# Patient Record
Sex: FEMALE | Employment: UNEMPLOYED | URBAN - METROPOLITAN AREA
[De-identification: names, ages, dates, MRNs, and addresses within clinical notes are randomized per-mention and may not be internally consistent; named-entity substitution may affect disease eponyms.]

---

## 2017-07-03 ENCOUNTER — TELEPHONE (OUTPATIENT)
Dept: FAMILY MEDICINE | Facility: CLINIC | Age: 52
End: 2017-07-03

## 2017-07-03 DIAGNOSIS — Z12.31 VISIT FOR SCREENING MAMMOGRAM: Primary | ICD-10-CM

## 2017-07-03 NOTE — LETTER
July 3, 2017    Yolanda Cheek  [address] [facility] [address]    Dear Yolanda    We care about your health and have reviewed your health plan. We have reviewed your medical conditions, medication list, and lab results and are making recommendations based on this review, to better manage your health.    You are in particular need of attention regarding:  - Your Depression  - Scheduling a Breast Cancer Screening (Mammography) [phone]  - Scheduling a Colon Cancer Screening (Colonoscopy only) [phone]      Here is a list of Health Maintenance topics that are due now or due soon:  Health Maintenance Due   Topic Date Due     MAMMO SCREEN Q2 YR (SYSTEM ASSIGNED)  04/16/2007     PHQ-9 Q6 MONTHS  02/18/2017     COLON CANCER SCREEN (SYSTEM ASSIGNED)  04/16/2017     We will be calling you in the next couple of weeks to help you schedule any appointments that are needed.  Please call us at [phone] (or use Digital Lumens) to address the above recommendations.     Thank you for trusting [facility] and we appreciate the opportunity to serve you.  We look forward to supporting your healthcare needs in the future.    Healthy Regards,    Your Care Team

## 2017-07-03 NOTE — TELEPHONE ENCOUNTER
Panel Management Review      Patient has the following on her problem list:     Depression / Dysthymia review  PHQ-9 SCORE 8/18/2016   Total Score 2      Patient is due for:  PHQ9      Composite cancer screening  Chart review shows that this patient is due/due soon for the following Mammogram and Colonoscopy  Summary:    Patient is due/failing the following:   COLONOSCOPY, MAMMOGRAM and PHQ9    Action needed:   Patient needs referral/order: Mammogram, Colon, and OV for Depression    Type of outreach:    Sent letter.    Questions for provider review:    None                                                                                                                                    Oksana Estrada MA       Chart routed to Care Team .

## 2017-11-15 ENCOUNTER — TELEPHONE (OUTPATIENT)
Dept: FAMILY MEDICINE | Facility: CLINIC | Age: 52
End: 2017-11-15

## 2017-11-15 NOTE — LETTER
November 15, 2017    Yolanda Cheek  [id number]  [facility] [address]    Dear Yolanda    We care about your health and have reviewed your health plan. We have reviewed your medical conditions, medication list, and lab results and are making recommendations based on this review, to better manage your health.    You are in particular need of attention regarding:  - Your Depression  - Scheduling a Breast Cancer Screening (Mammography) [phone]  - Scheduling a Colon Cancer Screening (Colonoscopy only) [phone]      Here is a list of Health Maintenance topics that are due now or due soon:  Health Maintenance Due   Topic Date Due     PHQ-9 Q6 MONTHS  02/18/2017     MAMMO SCREEN Q2 YR (SYSTEM ASSIGNED)  04/16/2017     COLON CANCER SCREEN (SYSTEM ASSIGNED)  04/16/2017     INFLUENZA VACCINE (SYSTEM ASSIGNED)  09/01/2017     COPD ACTION PLAN Q1 YR  12/05/2017     DEPRESSION ACTION PLAN Q1 YR  12/05/2017     We will be calling you in the next couple of weeks to help you schedule any appointments that are needed.  Please call us at [phone] (or use Narrative) to address the above recommendations.     Thank you for trusting [facility] and we appreciate the opportunity to serve you.  We look forward to supporting your healthcare needs in the future.    Healthy Regards,  Ariana Allison

## 2017-11-15 NOTE — TELEPHONE ENCOUNTER
Panel Management Review      Patient has the following on her problem list:     Depression / Dysthymia review    Measure:  Needs PHQ-9 score of 4 or less during index window.  Administer PHQ-9 and if score is 5 or more, send encounter to provider for next steps.    5   7 month window range:     PHQ-9 SCORE 8/18/2016   Total Score 2       If PHQ-9 recheck is 5 or more, route to provider for next steps.    Patient is due for:  PHQ9 and DAP        Composite cancer screening  Chart review shows that this patient is due/due soon for the following Mammogram and Colonoscopy  Summary:    Patient is due/failing the following:   COLONOSCOPY, DAP, MAMMOGRAM and PHQ9    Action needed:   Patient needs to do PHQ9. and MAMMOGRAM AND COLONOSCOPY     Type of outreach:    Sent letter. AND PHQ9     Questions for provider review:    None                                                                                                                                    Mel Rios MA       Chart routed to Care Team .

## 2018-02-13 ENCOUNTER — TELEPHONE (OUTPATIENT)
Dept: FAMILY MEDICINE | Facility: CLINIC | Age: 53
End: 2018-02-13

## 2018-02-13 NOTE — LETTER
February 13, 2018    Yolanda Rodriguezyara  [id number]  [facility] [place] [place] [address]    Dear Yolanda    We care about your health and have reviewed your health plan. We have reviewed your medical conditions, medication list, and lab results and are making recommendations based on this review, to better manage your health.    You are in particular need of attention regarding:  - Your Depression  - Scheduling a Breast Cancer Screening (Mammography) [phone]  - Scheduling a Colon Cancer Screening (Colonoscopy only) [phone]      Here is a list of Health Maintenance topics that are due now or due soon:  Health Maintenance Due   Topic Date Due     PHQ-9 Q6 MONTHS  02/18/2017     MAMMO SCREEN Q2 YR (SYSTEM ASSIGNED)  04/16/2017     COLON CANCER SCREEN (SYSTEM ASSIGNED)  04/16/2017     INFLUENZA VACCINE (SYSTEM ASSIGNED)  09/01/2017     COPD ACTION PLAN Q1 YR  12/05/2017     DEPRESSION ACTION PLAN Q1 YR  12/05/2017     We will be calling you in the next couple of weeks to help you schedule any appointments that are needed.  Please call us at [phone] (or use Jarvam) to address the above recommendations.     Thank you for trusting [facility] and we appreciate the opportunity to serve you.  We look forward to supporting your healthcare needs in the future.    Healthy Regards,    Maggie Allison

## 2018-02-13 NOTE — LETTER
March 14, 2018    Yolanda Adia  [id number]  [place] [place] [facility] [id number]      Dear Yolanda Adia,     We have tried to contact you about your health, but have been unable to reach you.  Please call us as soon as possible so we can provide you with the best care possible.  We will continue to check in with you throughout the year to complete these items of care, if you are not able to complete these items at this time.  If you would like to complete the missing items for your care, please contact us at [phone].    We recommend the following:  -schedule a MAMMOGRAM 1 in 8 women will develop invasive breast cancer during her lifetime and it is the most common non-skin cancer in American women.  EARLY detection, new treatments, and a better understanding of the disease have increased survival rates - the 5 year survival rate in the 1960s was 63% and today it is close to 90% .  Please disregard this reminder if you have had this exam elsewhere within the last year.  It would be helpful for us to have a copy of your mammogram report in our file so that we can best coordinate your care - please contact us with when your test was done so we can update your record. Please call [phone] to schedule your mammogram today.   -schedule a COLONOSCOPY to look for colon cancer (due every 10 years or 5 years in higher risk situations.)   Colon cancer is now the second leading cause of death in the [place] for both men and women and there are over 130,000 new cases and 50,000 deaths per year from colon cancer.  Colonoscopies can prevent 90-95% of these deaths.  Problem lesions can be removed before they ever become cancer.  This test is not only looking for cancer, but also getting rid of precancerious lesions.  If you do not wish to do a colonoscopy or cannot afford to do one, at this time, there is another option. It is called a FIT test.        Sincerely,     Your Care Team at [place]  [facility]

## 2018-02-13 NOTE — TELEPHONE ENCOUNTER
Panel Management Review      Patient has the following on her problem list:     Depression / Dysthymia review    Measure:  Needs PHQ-9 score of 4 or less during index window.  Administer PHQ-9 and if score is 5 or more, send encounter to provider for next steps.    5   7 month window range:     PHQ-9 SCORE 8/18/2016   Total Score 2       If PHQ-9 recheck is 5 or more, route to provider for next steps.    Patient is due for:  PHQ9 and DAP      Composite cancer screening  Chart review shows that this patient is due/due soon for the following Mammogram and Colonoscopy  Summary:    Patient is due/failing the following:   COLONOSCOPY, DAP, MAMMOGRAM and PHQ9    Action needed:   Patient needs office visit for depression .    Type of outreach:    Sent letter.    Questions for provider review:    None                                                                                                                                    Mel Rios MA       Chart routed to Care Team .

## 2021-07-09 ENCOUNTER — TRANSCRIBE ORDERS (OUTPATIENT)
Dept: OTHER | Age: 56
End: 2021-07-09

## 2021-07-09 DIAGNOSIS — C34.90 LUNG CANCER (H): Primary | ICD-10-CM

## 2021-07-14 NOTE — TELEPHONE ENCOUNTER
RECORDS STATUS - ALL OTHER DIAGNOSIS      RECORDS RECEIVED FROM: [facility]   DATE RECEIVED:    NOTES STATUS DETAILS   OFFICE NOTE from referring provider [facility]    OFFICE NOTE from medical oncologist [facility] Dr. Zoey Garcia: 21   DISCHARGE SUMMARY from hospital NA    DISCHARGE REPORT from the ER [facility] 21, 21, 21, 21, 21, 3/18/21, 2/15/21   OPERATIVE REPORT [facility] 21: EBUS  20: EGD   MEDICATION LIST [facility]   CLINICAL TRIAL TREATMENTS TO DATE     LABS     PATHOLOGY REPORTS [facility]), Report in CE, Slides requested   [employer] 21: [id number]   ANYTHING RELATED TO DIAGNOSIS Epic/CE 21   GENONOMIC TESTING     TYPE:     IMAGING (NEED IMAGES & REPORT)     XR PACS 21: [facility]   CT SCANS PACS 21: [facility]   MRI PACS 21: [facility]   MAMMO     ULTRASOUND     PET PACS 21: [facility]

## 2021-07-19 NOTE — TELEPHONE ENCOUNTER
Path slides arrived from [place] - taken to 5th [facility] path lab

## 2021-07-20 ENCOUNTER — LAB (OUTPATIENT)
Dept: LAB | Facility: CLINIC | Age: 56
End: 2021-07-20
Payer: COMMERCIAL

## 2021-07-20 DIAGNOSIS — C34.91 ADENOCARCINOMA OF LUNG, RIGHT (H): Primary | ICD-10-CM

## 2021-07-21 ENCOUNTER — PRE VISIT (OUTPATIENT)
Dept: SURGERY | Facility: CLINIC | Age: 56
End: 2021-07-21

## 2021-07-21 ENCOUNTER — VIRTUAL VISIT (OUTPATIENT)
Dept: SURGERY | Facility: CLINIC | Age: 56
End: 2021-07-21
Attending: THORACIC SURGERY (CARDIOTHORACIC VASCULAR SURGERY)
Payer: COMMERCIAL

## 2021-07-21 DIAGNOSIS — C34.2 LUNG CANCER, MIDDLE LOBE (H): Primary | ICD-10-CM

## 2021-07-21 PROCEDURE — 999N000109 HC STATISTIC OP CR VISIT

## 2021-07-21 PROCEDURE — 99204 OFFICE O/P NEW MOD 45 MIN: CPT | Mod: 95 | Performed by: THORACIC SURGERY (CARDIOTHORACIC VASCULAR SURGERY)

## 2021-07-21 RX ORDER — MULTIVIT WITH MINERALS/LUTEIN
250 TABLET ORAL EVERY MORNING
COMMUNITY
Start: 2020-09-25

## 2021-07-21 RX ORDER — ONDANSETRON 4 MG/1
4 TABLET, ORALLY DISINTEGRATING ORAL EVERY 4 HOURS PRN
COMMUNITY
Start: 2021-07-07

## 2021-07-21 RX ORDER — UBIDECARENONE 75 MG
CAPSULE ORAL EVERY MORNING
COMMUNITY
Start: 2020-09-27

## 2021-07-21 RX ORDER — OMEPRAZOLE 40 MG/1
CAPSULE, DELAYED RELEASE ORAL EVERY MORNING
Status: ON HOLD | COMMUNITY
Start: 2021-03-22 | End: 2021-08-27

## 2021-07-21 RX ORDER — TRAZODONE HYDROCHLORIDE 50 MG/1
50 TABLET, FILM COATED ORAL AT BEDTIME
COMMUNITY
Start: 2021-07-01

## 2021-07-21 RX ORDER — LIDOCAINE HYDROCHLORIDE 20 MG/ML
SOLUTION OROPHARYNGEAL
Status: ON HOLD | COMMUNITY
Start: 2021-05-08 | End: 2021-08-27

## 2021-07-21 RX ORDER — GABAPENTIN 300 MG/1
CAPSULE ORAL
Status: ON HOLD | COMMUNITY
Start: 2021-02-16 | End: 2021-08-27

## 2021-07-21 NOTE — PROGRESS NOTES
"Yolanda is a 56 year old who is being evaluated via a billable video visit.      How would you like to obtain your AVS? MyChart  If the video visit is dropped, the invitation should be resent by: Text to cell phone: [phone]  Will anyone else be joining your video visit? No       I have reviewed and updated the patient's allergies and medication list.    Concerns: none  Refills: none     Vitals - Patient Reported  Weight (Patient Reported): 62.1 kg (137 lb)  Height (Patient Reported): 172.7 cm (5' 8\")  BMI (Based on Pt Reported Ht/Wt): 20.83  Pain Score: No Pain (0)    Yakelin Lockwood CMA        Phone visit: 10 min    THORACIC SURGERY - NEW PATIENT OFFICE VISIT     Dear Dr. Stanley,    I saw Ms. Cheek at your request in consultation for the evaluation and treatment of a lung cancer.     HPI  Ms. 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 She originally presented 5/7/2021 to ED with abdominal pain for weeks with middle lobe nodule found incidentally. Follow-up CT chest demonstrated 1.6 x 2.3 RML nodule. She subsequently underwent EBUS with Dr. Richmond with mass positive for adenocarcinoma.      Previsit Tests   5/7/2021 CT chest with IV contrast      6/16/2021 EBUS (Pathology consult requested)  Right hilar mass positive for adenocarcinoma, TTF-1 positive  Station 7 and 11R negative for malignancy    6/29/2021 PET CT  Hyperintense right middle lobe lesion, no evidence of distal mets    7/1/2021 - MRI brain negative    PFT (7/1/2021; [facility], no report available)    PMH  Tobacco use disorder   Dysplasia of cervix   Asthma  Alcohol abuse   PTSD (post-traumatic stress disorder)   Major depressive disorder, recurrent episode, moderate (HCC)   Chronic obstructive pulmonary disease (HCC)   Gastroesophageal reflux disease       Past Medical History:   Diagnosis Date     Alcohol abuse with intoxication (H) 7/24/2016 "     Chemical dependency (H) 2016     Substance abuse 2016    alcoholism        PSH  HUMZA unilateral salpingo oophorectomy    Past Surgical History:   Procedure Laterality Date     GYN SURGERY      hysterectomy with one ovary removed.      HYSTERECTOMY, PAP NO LONGER INDICATED         ETOH: history of abuse, quit 2021  TOB: 40 pack years, quit 2021      IMPRESSION (C34.2) Lung cancer, middle lobe (H)  (primary encounter diagnosis)    56 year old year-old female with a RIGHT middle lobe adenocarcinoma, clinical stage IIB [id number]    PLAN  I spent 20 min on the date of the encounter in chart review, patient visit, review of tests, documentation and/or discussion with other providers about the issues documented above. I reviewed the plan as follows:    Since she lives far away, we plan to see her in person in clinic on a Wednesday and do surgery the following Friday.      1) Procedure planned: TEMLA, possible RIGHT VATS middle lobectomy, with possible thoracotomy.  I reviewed the indications, risks, and benefits of the procedure with Ms. Yolanda Cheek. We discussed the intraoperative risks of bleeding and injury to vital organs, potential postoperative complications including, but not limited to, major respiratory events, arrhythmia, bleeding, infection, reoperation, and death. I explained the anticipated hospital course (+/- 1-3 days) and postoperative recovery including pain control, chest drain management, and variable degrees of dyspnea (or need for supplemental oxygen) and fatigue that tend to get better with time.      Necessary Preop Testin) Repeat CT chest with IV contrast prior to surgery  2) Over-read of brain MRI and PET-CT  3) Review pathology consult  4) Obtain PFT report from 2021      They had all their questions answered and were in agreement with the plan.  I appreciate the opportunity to participate in the care of your patient and will keep you updated.  Sincerely,

## 2021-07-21 NOTE — LETTER
"    7/21/2021         RE: Yolanda Cheek  [address]  [facility] [address]        Dear Colleague,    Thank you for referring your patient, Yolanda Cheek, to the [facility]. Please see a copy of my visit note below.    Yolanda is a 56 year old who is being evaluated via a billable video visit.      How would you like to obtain your AVS? MyChart  If the video visit is dropped, the invitation should be resent by: Text to cell phone: [phone]  Will anyone else be joining your video visit? No       I have reviewed and updated the patient's allergies and medication list.    Concerns: none  Refills: none     Vitals - Patient Reported  Weight (Patient Reported): 62.1 kg (137 lb)  Height (Patient Reported): 172.7 cm (5' 8\")  BMI (Based on Pt Reported Ht/Wt): 20.83  Pain Score: No Pain (0)    Yakelin Lockwood CMA        Phone visit: 10 min    THORACIC SURGERY - NEW PATIENT OFFICE VISIT     Dear Dr. Stanley,    I saw Ms. Cheek at your request in consultation for the evaluation and treatment of a lung cancer.     HPI  Ms. 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 She originally presented 5/7/2021 to ED with abdominal pain for weeks with middle lobe nodule found incidentally. Follow-up CT chest demonstrated 1.6 x 2.3 RML nodule. She subsequently underwent EBUS with Dr. Richmond with mass positive for adenocarcinoma.      Previsit Tests   5/7/2021 CT chest with IV contrast      6/16/2021 EBUS (Pathology consult requested)  Right hilar mass positive for adenocarcinoma, TTF-1 positive  Station 7 and 11R negative for malignancy    6/29/2021 PET CT  Hyperintense right middle lobe lesion, no evidence of distal mets    7/1/2021 - MRI brain negative    PFT (7/1/2021; [facility], no report available)    PMH  Tobacco use disorder   Dysplasia of cervix   Asthma  Alcohol abuse   PTSD " (post-traumatic stress disorder)   Major depressive disorder, recurrent episode, moderate (HCC)   Chronic obstructive pulmonary disease (HCC)   Gastroesophageal reflux disease       Past Medical History:   Diagnosis Date     Alcohol abuse with intoxication (H) 2016     Chemical dependency (H) 2016     Substance abuse 2016    alcoholism        PSH  HUMZA unilateral salpingo oophorectomy    Past Surgical History:   Procedure Laterality Date     GYN SURGERY      hysterectomy with one ovary removed.      HYSTERECTOMY, PAP NO LONGER INDICATED         ETOH: history of abuse, quit 2021  TOB: 40 pack years, quit 2021      IMPRESSION (C34.2) Lung cancer, middle lobe (H)  (primary encounter diagnosis)    56 year old year-old female with a RIGHT middle lobe adenocarcinoma, clinical stage IIB [id number]    PLAN  I spent 20 min on the date of the encounter in chart review, patient visit, review of tests, documentation and/or discussion with other providers about the issues documented above. I reviewed the plan as follows:    Since she lives far away, we plan to see her in person in clinic on a Wednesday and do surgery the following Friday.      1) Procedure planned: TEMLA, possible RIGHT VATS middle lobectomy, with possible thoracotomy.  I reviewed the indications, risks, and benefits of the procedure with Ms. Yolanda Cheek. We discussed the intraoperative risks of bleeding and injury to vital organs, potential postoperative complications including, but not limited to, major respiratory events, arrhythmia, bleeding, infection, reoperation, and death. I explained the anticipated hospital course (+/- 1-3 days) and postoperative recovery including pain control, chest drain management, and variable degrees of dyspnea (or need for supplemental oxygen) and fatigue that tend to get better with time.      Necessary Preop Testin) Repeat CT chest with IV contrast prior to surgery  2) Over-read of brain MRI  and PET-CT  3) Review pathology consult  4) Obtain PFT report from 7/1/2021      They had all their questions answered and were in agreement with the plan.  I appreciate the opportunity to participate in the care of your patient and will keep you updated.  Sincerely,        Again, thank you for allowing me to participate in the care of your patient.        Sincerely,        Eliezer Knox MD

## 2021-07-23 LAB
PATH REPORT.COMMENTS IMP SPEC: NORMAL
PATH REPORT.FINAL DX SPEC: NORMAL
PATH REPORT.GROSS SPEC: NORMAL
PATH REPORT.RELEVANT HX SPEC: NORMAL
PATH REPORT.RELEVANT HX SPEC: NORMAL
PATH REPORT.SITE OF ORIGIN SPEC: NORMAL

## 2021-07-23 PROCEDURE — 88321 CONSLTJ&REPRT SLD PREP ELSWR: CPT | Performed by: PATHOLOGY

## 2021-07-27 ENCOUNTER — TELEPHONE (OUTPATIENT)
Dept: SURGERY | Facility: CLINIC | Age: 56
End: 2021-07-27

## 2021-07-27 NOTE — TELEPHONE ENCOUNTER
Received voicemail from [facility] - new patient oncology scheduling.    Stated Yolanda is calling in to schedule procedure with Dr. Knox.    [facility] has the incorrect number for Dr. Knox's surgical scheduler.     Magalie can be reached at [phone].    Will route call to Magalie and also try to update new patient scheduling so that they have correct information.

## 2021-07-28 ENCOUNTER — TELEPHONE (OUTPATIENT)
Dept: SURGERY | Facility: CLINIC | Age: 56
End: 2021-07-28

## 2021-07-28 NOTE — TELEPHONE ENCOUNTER
Received voicemail from patient.    Saw Dr. Knox and Dr. Knox told her that surgery cannot wait. Patient seems frustrated because she has not heard back from Dr. Knox's team regarding scheduling surgery.    I am not the surgical coordinator for Dr. Knox.     Called Yolanda to let her know that I am not the coordinator and provided her Magalie's number and noted that I have sent two messages to the team.     Routed patient call from new patient oncology scheduling yesterday to Magalie.     Will route this patient call to Magalie and also to Dr. Knox as I am not sure who the RNCC.     - I did reach out to new patient Oncology scheduling yesterday and provided them an updated niru-op coordinator list. Unsure who provided my number to patient.

## 2021-08-02 DIAGNOSIS — C34.2 LUNG CANCER, MIDDLE LOBE (H): Primary | ICD-10-CM

## 2021-08-03 ENCOUNTER — HOSPITAL ENCOUNTER (INPATIENT)
Dept: GENERAL RADIOLOGY | Facility: CLINIC | Age: 56
End: 2021-08-03
Attending: CLINICAL NURSE SPECIALIST
Payer: COMMERCIAL

## 2021-08-03 DIAGNOSIS — C34.2 LUNG CANCER, MIDDLE LOBE (H): ICD-10-CM

## 2021-08-03 PROCEDURE — 78815 PET IMAGE W/CT SKULL-THIGH: CPT | Mod: 26

## 2021-08-04 ENCOUNTER — PREP FOR PROCEDURE (OUTPATIENT)
Dept: SURGERY | Facility: CLINIC | Age: 56
End: 2021-08-04

## 2021-08-04 DIAGNOSIS — R91.8 LUNG MASS: Primary | ICD-10-CM

## 2021-08-04 RX ORDER — CEFAZOLIN SODIUM 2 G/50ML
2 SOLUTION INTRAVENOUS
Status: CANCELLED | OUTPATIENT
Start: 2021-08-04

## 2021-08-04 RX ORDER — CEFAZOLIN SODIUM 2 G/50ML
2 SOLUTION INTRAVENOUS SEE ADMIN INSTRUCTIONS
Status: CANCELLED | OUTPATIENT
Start: 2021-08-04

## 2021-08-05 ENCOUNTER — TELEPHONE (OUTPATIENT)
Dept: SURGERY | Facility: CLINIC | Age: 56
End: 2021-08-05

## 2021-08-05 DIAGNOSIS — Z11.59 ENCOUNTER FOR SCREENING FOR OTHER VIRAL DISEASES: ICD-10-CM

## 2021-08-05 PROBLEM — R91.8 LUNG MASS: Status: ACTIVE | Noted: 2021-08-05

## 2021-08-05 NOTE — TELEPHONE ENCOUNTER
Spoke with patient to schedule procedure with Dr. Eliezer Knox    Procedure was scheduled on 08/27 at [facility] OR  Patient will have H&P with PAC 08/25    Patient is aware a COVID-19 test is needed before their procedure. The test should be with-in 4 days of their procedure.   Test Details: Date 08/25 Location [facility]    Patient is aware a / is needed day of surgery.   Surgery letter was sent via [employer] and Mail, patient has my direct contact information for any further questions.

## 2021-08-06 NOTE — TELEPHONE ENCOUNTER
FUTURE VISIT INFORMATION      SURGERY INFORMATION:    Date: 21    Location: [facility] OR    Surgeon:  Eliezer Knox MD    Anesthesia Type:  general    Procedure: RIGHT THORACOSCOPIC MIDDLE LOBECTOMY LYMPHADENECTOMY, MEDIASTINUM, EXTENDED, TRANSCERVICAL APPROACH    Consult: virtual visit     RECORDS REQUESTED FROM:       Primary Care Provider: Ivette Stewart MD - [facility]    Pertinent Medical History: COPD    Most recent EKG+ Tracin21- [facility]

## 2021-08-11 ENCOUNTER — PREP FOR PROCEDURE (OUTPATIENT)
Dept: SURGERY | Facility: CLINIC | Age: 56
End: 2021-08-11

## 2021-08-12 DIAGNOSIS — R91.8 LUNG MASS: Primary | ICD-10-CM

## 2021-08-13 ENCOUNTER — HOSPITAL ENCOUNTER (INPATIENT)
Dept: GENERAL RADIOLOGY | Facility: CLINIC | Age: 56
End: 2021-08-13
Attending: CLINICAL NURSE SPECIALIST
Payer: COMMERCIAL

## 2021-08-13 DIAGNOSIS — C34.2 LUNG CANCER, MIDDLE LOBE (H): ICD-10-CM

## 2021-08-13 DIAGNOSIS — C34.2 LUNG CANCER, MIDDLE LOBE (H): Primary | ICD-10-CM

## 2021-08-13 PROCEDURE — 70553 MRI BRAIN STEM W/O & W/DYE: CPT | Mod: 26 | Performed by: RADIOLOGY

## 2021-08-14 ENCOUNTER — HEALTH MAINTENANCE LETTER (OUTPATIENT)
Age: 56
End: 2021-08-14

## 2021-08-25 ENCOUNTER — ANCILLARY PROCEDURE (OUTPATIENT)
Dept: CT IMAGING | Facility: CLINIC | Age: 56
End: 2021-08-25
Attending: CLINICAL NURSE SPECIALIST
Payer: COMMERCIAL

## 2021-08-25 ENCOUNTER — PRE VISIT (OUTPATIENT)
Dept: SURGERY | Facility: CLINIC | Age: 56
End: 2021-08-25

## 2021-08-25 ENCOUNTER — LAB (OUTPATIENT)
Dept: LAB | Facility: CLINIC | Age: 56
End: 2021-08-25
Attending: THORACIC SURGERY (CARDIOTHORACIC VASCULAR SURGERY)
Payer: COMMERCIAL

## 2021-08-25 ENCOUNTER — OFFICE VISIT (OUTPATIENT)
Dept: SURGERY | Facility: CLINIC | Age: 56
End: 2021-08-25
Attending: THORACIC SURGERY (CARDIOTHORACIC VASCULAR SURGERY)
Payer: COMMERCIAL

## 2021-08-25 ENCOUNTER — ANESTHESIA EVENT (OUTPATIENT)
Dept: SURGERY | Facility: CLINIC | Age: 56
End: 2021-08-25
Payer: COMMERCIAL

## 2021-08-25 ENCOUNTER — LAB (OUTPATIENT)
Dept: LAB | Facility: CLINIC | Age: 56
End: 2021-08-25
Payer: COMMERCIAL

## 2021-08-25 VITALS
BODY MASS INDEX: 22.47 KG/M2 | HEART RATE: 85 BPM | DIASTOLIC BLOOD PRESSURE: 73 MMHG | WEIGHT: 143.2 LBS | HEIGHT: 67 IN | TEMPERATURE: 98.6 F | SYSTOLIC BLOOD PRESSURE: 111 MMHG | OXYGEN SATURATION: 100 % | RESPIRATION RATE: 14 BRPM

## 2021-08-25 VITALS
HEART RATE: 107 BPM | BODY MASS INDEX: 22.54 KG/M2 | HEIGHT: 67 IN | OXYGEN SATURATION: 96 % | RESPIRATION RATE: 12 BRPM | SYSTOLIC BLOOD PRESSURE: 119 MMHG | DIASTOLIC BLOOD PRESSURE: 76 MMHG | WEIGHT: 143.6 LBS | TEMPERATURE: 98.4 F

## 2021-08-25 DIAGNOSIS — R91.8 LUNG MASS: ICD-10-CM

## 2021-08-25 DIAGNOSIS — Z01.818 PRE-OP EXAMINATION: ICD-10-CM

## 2021-08-25 DIAGNOSIS — C34.2 LUNG CANCER, MIDDLE LOBE (H): ICD-10-CM

## 2021-08-25 DIAGNOSIS — Z11.59 ENCOUNTER FOR SCREENING FOR OTHER VIRAL DISEASES: ICD-10-CM

## 2021-08-25 DIAGNOSIS — Z01.818 PRE-OP EXAMINATION: Primary | ICD-10-CM

## 2021-08-25 DIAGNOSIS — C34.2 LUNG CANCER, MIDDLE LOBE (H): Primary | ICD-10-CM

## 2021-08-25 LAB
ALBUMIN SERPL-MCNC: 3.4 G/DL (ref 3.4–5)
ALP SERPL-CCNC: 103 U/L (ref 40–150)
ALT SERPL W P-5'-P-CCNC: 19 U/L (ref 0–50)
ANION GAP SERPL CALCULATED.3IONS-SCNC: 11 MMOL/L (ref 3–14)
AST SERPL W P-5'-P-CCNC: 29 U/L (ref 0–45)
BILIRUB SERPL-MCNC: 1.8 MG/DL (ref 0.2–1.3)
BUN SERPL-MCNC: 4 MG/DL (ref 7–30)
CALCIUM SERPL-MCNC: 9 MG/DL (ref 8.5–10.1)
CHLORIDE BLD-SCNC: 104 MMOL/L (ref 94–109)
CO2 SERPL-SCNC: 27 MMOL/L (ref 20–32)
CREAT SERPL-MCNC: 0.56 MG/DL (ref 0.52–1.04)
DLCOUNC-%PRED-PRE: 77 %
DLCOUNC-PRE: 17.84 ML/MIN/MMHG
DLCOUNC-PRED: 22.88 ML/MIN/MMHG
ERV-%PRED-PRE: 142 %
ERV-PRE: 1.61 L
ERV-PRED: 1.13 L
ERYTHROCYTE [DISTWIDTH] IN BLOOD BY AUTOMATED COUNT: 15 % (ref 10–15)
EXPTIME-PRE: 9.08 SEC
FEF2575-%PRED-POST: 89 %
FEF2575-%PRED-PRE: 71 %
FEF2575-POST: 2.31 L/SEC
FEF2575-PRE: 1.84 L/SEC
FEF2575-PRED: 2.57 L/SEC
FEFMAX-%PRED-PRE: 100 %
FEFMAX-PRE: 7.09 L/SEC
FEFMAX-PRED: 7.06 L/SEC
FEV1-%PRED-PRE: 111 %
FEV1-PRE: 3.05 L
FEV1FEV6-PRE: 71 %
FEV1FEV6-PRED: 82 %
FEV1FVC-PRE: 69 %
FEV1FVC-PRED: 80 %
FEV1SVC-PRE: 70 %
FEV1SVC-PRED: 72 %
FIFMAX-PRE: 4.86 L/SEC
FRCPLETH-%PRED-PRE: 138 %
FRCPLETH-PRE: 3.97 L
FRCPLETH-PRED: 2.87 L
FVC-%PRED-PRE: 129 %
FVC-PRE: 4.43 L
FVC-PRED: 3.41 L
GFR SERPL CREATININE-BSD FRML MDRD: >90 ML/MIN/1.73M2
GLUCOSE BLD-MCNC: 118 MG/DL (ref 70–99)
HCT VFR BLD AUTO: 35.2 % (ref 35–47)
HGB BLD-MCNC: 12.4 G/DL (ref 11.7–15.7)
IC-%PRED-PRE: 102 %
IC-PRE: 2.71 L
IC-PRED: 2.65 L
INR PPP: 1.13 (ref 0.85–1.15)
MAGNESIUM SERPL-MCNC: 1.9 MG/DL (ref 1.6–2.3)
MCH RBC QN AUTO: 36.8 PG (ref 26.5–33)
MCHC RBC AUTO-ENTMCNC: 35.2 G/DL (ref 31.5–36.5)
MCV RBC AUTO: 105 FL (ref 78–100)
PLATELET # BLD AUTO: 301 10E3/UL (ref 150–450)
POTASSIUM BLD-SCNC: 4.1 MMOL/L (ref 3.4–5.3)
PROT SERPL-MCNC: 6.9 G/DL (ref 6.8–8.8)
RBC # BLD AUTO: 3.37 10E6/UL (ref 3.8–5.2)
RVPLETH-%PRED-PRE: 121 %
RVPLETH-PRE: 2.36 L
RVPLETH-PRED: 1.94 L
SODIUM SERPL-SCNC: 142 MMOL/L (ref 133–144)
TLCPLETH-%PRED-PRE: 122 %
TLCPLETH-PRE: 6.68 L
TLCPLETH-PRED: 5.44 L
VA-%PRED-PRE: 116 %
VA-PRE: 6.37 L
VC-%PRED-PRE: 114 %
VC-PRE: 4.32 L
VC-PRED: 3.78 L
WBC # BLD AUTO: 8.2 10E3/UL (ref 4–11)

## 2021-08-25 PROCEDURE — 85610 PROTHROMBIN TIME: CPT | Performed by: PATHOLOGY

## 2021-08-25 PROCEDURE — 99203 OFFICE O/P NEW LOW 30 MIN: CPT | Performed by: PHYSICIAN ASSISTANT

## 2021-08-25 PROCEDURE — 83735 ASSAY OF MAGNESIUM: CPT | Performed by: PATHOLOGY

## 2021-08-25 PROCEDURE — 99214 OFFICE O/P EST MOD 30 MIN: CPT | Performed by: THORACIC SURGERY (CARDIOTHORACIC VASCULAR SURGERY)

## 2021-08-25 PROCEDURE — 94729 DIFFUSING CAPACITY: CPT | Performed by: INTERNAL MEDICINE

## 2021-08-25 PROCEDURE — 36415 COLL VENOUS BLD VENIPUNCTURE: CPT | Performed by: PATHOLOGY

## 2021-08-25 PROCEDURE — 94726 PLETHYSMOGRAPHY LUNG VOLUMES: CPT | Performed by: INTERNAL MEDICINE

## 2021-08-25 PROCEDURE — 71260 CT THORAX DX C+: CPT | Performed by: RADIOLOGY

## 2021-08-25 PROCEDURE — 94060 EVALUATION OF WHEEZING: CPT | Performed by: INTERNAL MEDICINE

## 2021-08-25 PROCEDURE — G0463 HOSPITAL OUTPT CLINIC VISIT: HCPCS

## 2021-08-25 PROCEDURE — 85027 COMPLETE CBC AUTOMATED: CPT | Performed by: PATHOLOGY

## 2021-08-25 PROCEDURE — 80053 COMPREHEN METABOLIC PANEL: CPT | Performed by: PATHOLOGY

## 2021-08-25 RX ORDER — CELECOXIB 200 MG/1
200 CAPSULE ORAL ONCE
Status: CANCELLED | OUTPATIENT
Start: 2021-08-25 | End: 2021-08-25

## 2021-08-25 RX ORDER — SODIUM CHLORIDE, SODIUM LACTATE, POTASSIUM CHLORIDE, CALCIUM CHLORIDE 600; 310; 30; 20 MG/100ML; MG/100ML; MG/100ML; MG/100ML
INJECTION, SOLUTION INTRAVENOUS CONTINUOUS
Status: CANCELLED | OUTPATIENT
Start: 2021-08-25

## 2021-08-25 RX ORDER — GABAPENTIN 300 MG/1
300 CAPSULE ORAL
Status: CANCELLED | OUTPATIENT
Start: 2021-08-25

## 2021-08-25 RX ORDER — LIDOCAINE 40 MG/G
CREAM TOPICAL
Status: CANCELLED | OUTPATIENT
Start: 2021-08-25

## 2021-08-25 RX ORDER — CHLORHEXIDINE GLUCONATE ORAL RINSE 1.2 MG/ML
15 SOLUTION DENTAL ONCE
Status: CANCELLED | OUTPATIENT
Start: 2021-08-25 | End: 2021-08-25

## 2021-08-25 RX ORDER — ACETAMINOPHEN 325 MG/1
975 TABLET ORAL ONCE
Status: CANCELLED | OUTPATIENT
Start: 2021-08-25 | End: 2021-08-25

## 2021-08-25 RX ORDER — IOPAMIDOL 755 MG/ML
76 INJECTION, SOLUTION INTRAVASCULAR ONCE
Status: COMPLETED | OUTPATIENT
Start: 2021-08-25 | End: 2021-08-25

## 2021-08-25 RX ADMIN — IOPAMIDOL 76 ML: 755 INJECTION, SOLUTION INTRAVASCULAR at 12:15

## 2021-08-25 ASSESSMENT — ENCOUNTER SYMPTOMS: SEIZURES: 0

## 2021-08-25 ASSESSMENT — PAIN SCALES - GENERAL
PAINLEVEL: NO PAIN (0)
PAINLEVEL: NO PAIN (0)

## 2021-08-25 ASSESSMENT — MIFFLIN-ST. JEOR
SCORE: 1282.3
SCORE: 1284

## 2021-08-25 ASSESSMENT — LIFESTYLE VARIABLES: TOBACCO_USE: 1

## 2021-08-25 NOTE — LETTER
8/25/2021         RE: Yolanda Cheek  [address]  [facility] [address]        Dear Colleague,    Thank you for referring your patient, Yolanda Cheek, to the [facility]. Please see a copy of my visit note below.    THORACIC SURGERY - ESTABLISHED PATIENT      Dear Dr. Stanley,     I saw Ms. Cheek in follow-up for the evaluation and treatment of a lung cancer.      HPI  Ms. Yolanda Cheek is a 56 year old year-old woman who is seen for Stage IIB T1c N1 M0 right middle lobe adenocarcinoma. She originally presented 5/7/2021 to ED with abdominal pain for weeks with middle lobe nodule found incidentally. Follow-up CT chest demonstrated 1.6 x 2.3 RML nodule. She subsequently underwent EBUS with Dr. Rihcmond with mass positive for adenocarcinoma. She is scheduled for a TEMLA, possible right VATS middle lobectomy and presents for her preoperative visit. No new complaints.         Previsit Tests   CT chest 8/25/2021: unchanged 2.8 cm RML nodule    5/7/2021 CT chest with IV contrast       6/16/2021 EBUS ([facility] interpretation of outside slides)  Right hilar mass positive for adenocarcinoma, TTF-1 positive  Station 7 and 11R negative for malignancy     6/29/2021 PET CT  Hyperintense right middle lobe lesion, no evidence of distal mets     7/1/2021 - MRI brain negative    PFT (8/25/2021): FEV 1 3.05L (111%), DLCO 77%     PMH  Tobacco use disorder   Dysplasia of cervix   Asthma  Alcohol abuse   PTSD (post-traumatic stress disorder)   Major depressive disorder, recurrent episode, moderate    Chronic obstructive pulmonary disease   Gastroesophageal reflux disease         Past Medical History        Past Medical History:   Diagnosis Date     Alcohol abuse with intoxication (H) 7/24/2016     Chemical dependency (H) 7/26/2016     Substance abuse 7/24/2016     alcoholism            PSH  HUMZA unilateral salpingo oophorectomy     Past Surgical History         Past Surgical History:  "  Procedure Laterality Date     GYN SURGERY         hysterectomy with one ovary removed.      HYSTERECTOMY, PAP NO LONGER INDICATED   2013            ETOH: history of abuse, now drinks occasionally  TOB: 40 pack years, has cut back but still smokes 6 cigarettes a day.     EXAM  /73   Pulse 85   Temp 98.6  F (37  C)   Resp 14   Ht 1.702 m (5' 7.01\")   Wt 65 kg (143 lb 3.2 oz)   SpO2 100%   BMI 22.42 kg/m      General: Middle aged looking woman  Chest: Clear bilaterally  Cardiovascular: Regular rate and rhythm  Abdomen: Scaphoid, non-tender, non-distended  Extremities: No peripheral edema  Neurology: Awake, alert and oriented x 3  Psychiatry: Normal mood and affect    From a personal perspective, she is here with her sister Sarah.      IMPRESSION (C34.2) Lung cancer, middle lobe (H)  (primary encounter diagnosis)     56 year old year-old female with a RIGHT middle lobe adenocarcinoma, clinical stage IIB [id number]     PLAN  I spent 15 min on the date of the encounter in chart review, patient visit, review of tests, documentation and/or discussion with other providers about the issues documented above. I reviewed the plan as follows:                  Procedure planned:    (1) TEMLA, possible RIGHT VATS middle lobectomy, with possible thoracotomy.  I  reviewed the indications, risks, and benefits of the procedure with Ms. Yolanda Cheek.    We discussed the intraoperative risks of bleeding and injury to vital organs, potential postoperative complications including, but not limited to, major respiratory events,  arrhythmia, bleeding, infection, reoperation, and death.   I explained the anticipated hospital course (+/- 1-3 days) and postoperative recovery including pain control, chest drain management, and variable degrees of dyspnea  (or need for supplemental oxygen) and fatigue that tend to get better with time.   Enoxaparin 40 mg subcutaneous in preop holding, pneumatic compression stockings   Intraoperative " intercostal nerve block       They had all their questions answered and were in agreement with the plan.       Again, thank you for allowing me to participate in the care of your patient.        Sincerely,        Eliezer Knox MD

## 2021-08-25 NOTE — ANESTHESIA PREPROCEDURE EVALUATION
Anesthesia Pre-Procedure Evaluation    Patient: Yolanda Cheek   MRN: [id number] : 1965        Preoperative Diagnosis: Lung mass [R91.8]   Procedure : Procedure(s):  RIGHT THORACOSCOPIC MIDDLE LOBECTOMY  LYMPHADENECTOMY, MEDIASTINUM, EXTENDED, TRANSCERVICAL APPROACH     Past Medical History:   Diagnosis Date     Alcohol abuse with intoxication (H) 2016     Anal pain      Anxiety      Chemical dependency (H) 2016     Constipation      Depression      Gastroesophageal reflux disease with esophagitis      Substance abuse (H) 2016    alcoholism      Past Surgical History:   Procedure Laterality Date     COLONOSCOPY  2021     DILATION & CURETTAGE DX & OR THERAPEUTIC       x3     ENDOBRONCHIAL ULTRASOUND  2021     GYN SURGERY      hysterectomy with one ovary removed.      HYSTERECTOMY, PAP NO LONGER INDICATED  2013     UPPER GI ENDOSCOPY  2021      Allergies   Allergen Reactions     Bee Anaphylaxis      Social History     Tobacco Use     Smoking status: Current Every Day Smoker   Substance Use Topics     Alcohol use: No     Alcohol/week: 0.0 standard drinks     Comment: Recovering alcoholic      Wt Readings from Last 1 Encounters:   16 69.9 kg (154 lb)        Anesthesia Evaluation   Pt has had prior anesthetic. Type: General and MAC.        ROS/MED HX  ENT/Pulmonary:     (+) tobacco use, Current use, 1 packs/day,     Neurologic:  - neg neurologic ROS  (-) no seizures, no CVA and no TIA   Cardiovascular:     (+) -----Previous cardiac testing   Echo: Date: Results:    Stress Test: Date: Results:    ECG Reviewed: Date: 21 Results:  Normal sinus rhythm, low voltage QRS  Cath: Date: Results:      METS/Exercise Tolerance: 4 - Raking leaves, gardening    Hematologic:  - neg hematologic  ROS  (-) history of blood clots, anemia and history of blood transfusion   Musculoskeletal:  - neg musculoskeletal ROS     GI/Hepatic: Comment: Anal pain    Constipation     (+) GERD,  Asymptomatic on medication,     Renal/Genitourinary:  - neg Renal ROS     Endo:  - neg endo ROS     Psychiatric/Substance Use:     (+) psychiatric history anxiety alcohol abuse Recreational drug usage: Cannabis.    Infectious Disease:  - neg infectious disease ROS     Malignancy:   (+) Malignancy, History of Lung.  Lung CA Active status post.        Other:  - neg other ROS   (-) Any chance pregnant       Physical Exam    Airway        Mallampati: I   TM distance: > 3 FB   Neck ROM: full   Mouth opening: > 3 cm    Respiratory Devices and Support         Dental       (+) upper dentures and lower dentures      Cardiovascular   cardiovascular exam normal          Pulmonary   pulmonary exam normal                OUTSIDE LABS:  CBC:   Lab Results   Component Value Date    WBC 7.7 12/07/2016    WBC 10.7 07/24/2016    HGB 13.0 12/07/2016    HGB 12.7 07/24/2016    HCT 35.8 12/07/2016    HCT 36.2 07/24/2016     12/07/2016     07/24/2016     BMP:   Lab Results   Component Value Date     07/28/2016     07/26/2016    POTASSIUM 3.8 07/28/2016    POTASSIUM 3.7 07/26/2016    CHLORIDE 105 07/28/2016    CHLORIDE 109 07/26/2016    CO2 32 07/28/2016    CO2 30 07/26/2016    BUN 4 (L) 07/28/2016    BUN 2 (L) 07/26/2016    CR 0.54 07/28/2016    CR 0.47 (L) 07/26/2016     (H) 07/28/2016     (H) 07/26/2016     COAGS:   Lab Results   Component Value Date    INR 1.08 07/24/2016     POC: No results found for: BGM, HCG, HCGS  HEPATIC:   Lab Results   Component Value Date    ALBUMIN 3.6 09/12/2016    PROTTOTAL 7.4 09/12/2016    ALT 31 09/12/2016    AST 32 09/12/2016    ALKPHOS 89 09/12/2016    BILITOTAL 1.0 09/12/2016     OTHER:   Lab Results   Component Value Date    WHITLEY 8.9 07/28/2016    PHOS 3.8 07/24/2016    MAG 1.5 (L) 07/26/2016    LIPASE 152 07/24/2016    SED 24 (H) 12/07/2016       Anesthesia Plan    ASA Status:  3   NPO Status:  NPO Appropriate    Anesthesia Type: General.     - Airway: ETT    Induction: Intravenous.   Maintenance: Inhalation.   Techniques and Equipment:     - Lines/Monitors: 2nd IV, Arterial Line     Consents    Anesthesia Plan(s) and associated risks, benefits, and realistic alternatives discussed. Questions answered and patient/representative(s) expressed understanding.     - Discussed with:  Patient      - Extended Intubation/Ventilatory Support Discussed: Yes.      - Patient is DNR/DNI Status: No    Use of blood products discussed: Yes.     - Discussed with: Patient.     - Consented: consented to blood products            Reason for refusal: other.     Postoperative Care    Pain management: IV analgesics, Oral pain medications, Peripheral nerve block (Single Shot).   PONV prophylaxis: Ondansetron (or other 5HT-3)     Comments:              PAC Discussion and Assessment    ASA Classification: 3  Case is suitable for: [place]  Anesthetic techniques and relevant risks discussed: GA                  PAC Resident/NP Anesthesia Assessment: Yolanda Cheek is a 56 year old female who is scheduled for RIGHT THORACOSCOPIC MIDDLE LOBECTOMY, LYMPHADENECTOMY, MEDIASTINUM, EXTENDED, TRANSCERVICAL APPROACH on 8/27/21 by Dr. Knox in treatment of Lung mass.  PAC referral for risk assessment and optimization for anesthesia with comorbid conditions of former smoker, anal pain, constipation, GERD, anxiety, depression, alcohol and marijuana use:    Pre-operative considerations:  1.  Cardiac:  Functional status- METS 4, the patient walks 4 blocks every single day.  High risk surgery with 0.9% (RCRI #) risk of major adverse cardiac event.   ~ The patient recently went to the ER for chest pain and had EKG showing normal sinus rhythm, low voltage QRS and normal troponins. It was felt that her symptoms were musculoskeletal and anxiety in nature. She has not had return of symptoms. She has no risk factors. No further testing indicated.     2.  Pulm:  Airway feasible.  ANNELIESE risk: Low  ~ Smoker - formally  smoked 1 ppd but patient reports she has now cut down. We discussed smoking cessation for the day of surgery which the patient agrees to do.   ~ She is not smoking marijuana    3. GI:  Risk of PONV score = 3.  If > 2, anti-emetic intervention recommended.  ~ Anal pain/ constipation - continue anusol  ~ GERD - Well controlled. Continue prilosec    4. Psych: anxiety/depression - continue trazodone  ~ History of alcoholism - the patient had withdrawal symptoms in June but now only has 2-3 drinks a day and last had a drink 4 days ago. She has gone weeks in the past without issues. We discussed no alcohol for 24 hours prior to surgery. Will check CMP and INR given alcohol use.     VTE risk: 1.8%    Patient is optimized and is acceptable candidate for the proposed procedure.  No further diagnostic evaluation is needed.     Patient discussed with Dr. Moscoso     For further details of assessment, testing, and physical exam please see H and P completed on same date.    Kaylynn Gomez PA-C      Mid-Level Provider/Resident: Kaylynn Gomez PA-C  Date: 8/25/21                                 Kaylynn Gomez PA-C

## 2021-08-25 NOTE — NURSING NOTE
"Oncology Rooming Note    August 25, 2021 2:43 PM   Yolanda Cheek is a 56 year old female who presents for:    Chief Complaint   Patient presents with     Oncology Clinic Visit     Lung mass     Initial Vitals: /73   Pulse 85   Temp 98.6  F (37  C)   Resp 14   Ht 1.702 m (5' 7.01\")   Wt 65 kg (143 lb 3.2 oz)   SpO2 100%   BMI 22.42 kg/m   Estimated body mass index is 22.42 kg/m  as calculated from the following:    Height as of this encounter: 1.702 m (5' 7.01\").    Weight as of this encounter: 65 kg (143 lb 3.2 oz). Body surface area is 1.75 meters squared.  No Pain (0) Comment: Data Unavailable   No LMP recorded. Patient has had a hysterectomy.  Allergies reviewed: Yes  Medications reviewed: Yes    Medications: Medication refills not needed today.  Pharmacy name entered into EPIC:    [facility] - [place], [place] - [address] [facility] - [place], [place] - [address] [facility] [address]    Clinical concerns: Has a few questions        Vladimir Doss MA            "

## 2021-08-25 NOTE — PROGRESS NOTES
THORACIC SURGERY - ESTABLISHED PATIENT      Dear Dr. Stanley,     I saw Ms. Cheek in follow-up for the evaluation and treatment of a lung cancer.      HPI  Ms. Yolanda Cheek is a 56 year old year-old woman who is seen for Stage IIB T1c N1 M0 right middle lobe adenocarcinoma. She originally presented 5/7/2021 to ED with abdominal pain for weeks with middle lobe nodule found incidentally. Follow-up CT chest demonstrated 1.6 x 2.3 RML nodule. She subsequently underwent EBUS with Dr. Richmond with mass positive for adenocarcinoma. She is scheduled for a TEMLA, possible right VATS middle lobectomy and presents for her preoperative visit. No new complaints.         Previsit Tests   CT chest 8/25/2021: unchanged 2.8 cm RML nodule    5/7/2021 CT chest with IV contrast       6/16/2021 EBUS ([facility] interpretation of outside slides)  Right hilar mass positive for adenocarcinoma, TTF-1 positive  Station 7 and 11R negative for malignancy     6/29/2021 PET CT  Hyperintense right middle lobe lesion, no evidence of distal mets     7/1/2021 - MRI brain negative    PFT (8/25/2021): FEV 1 3.05L (111%), DLCO 77%     PMH  Tobacco use disorder   Dysplasia of cervix   Asthma  Alcohol abuse   PTSD (post-traumatic stress disorder)   Major depressive disorder, recurrent episode, moderate    Chronic obstructive pulmonary disease   Gastroesophageal reflux disease         Past Medical History        Past Medical History:   Diagnosis Date     Alcohol abuse with intoxication (H) 7/24/2016     Chemical dependency (H) 7/26/2016     Substance abuse 7/24/2016     alcoholism            PSH  HUMZA unilateral salpingo oophorectomy     Past Surgical History         Past Surgical History:   Procedure Laterality Date     GYN SURGERY         hysterectomy with one ovary removed.      HYSTERECTOMY, PAP NO LONGER INDICATED   2013            ETOH: history of abuse, now drinks occasionally  TOB: 40 pack years, has cut back but still smokes 6 cigarettes a day.  "    EXAM  /73   Pulse 85   Temp 98.6  F (37  C)   Resp 14   Ht 1.702 m (5' 7.01\")   Wt 65 kg (143 lb 3.2 oz)   SpO2 100%   BMI 22.42 kg/m      General: Middle aged looking woman  Chest: Clear bilaterally  Cardiovascular: Regular rate and rhythm  Abdomen: Scaphoid, non-tender, non-distended  Extremities: No peripheral edema  Neurology: Awake, alert and oriented x 3  Psychiatry: Normal mood and affect    From a personal perspective, she is here with her sister Sarah.      IMPRESSION (C34.2) Lung cancer, middle lobe (H)  (primary encounter diagnosis)     56 year old year-old female with a RIGHT middle lobe adenocarcinoma, clinical stage IIB [id number]     PLAN  I spent 15 min on the date of the encounter in chart review, patient visit, review of tests, documentation and/or discussion with other providers about the issues documented above. I reviewed the plan as follows:                  Procedure planned:    (1) TEMLA, possible RIGHT VATS middle lobectomy, with possible thoracotomy.  I  reviewed the indications, risks, and benefits of the procedure with Ms. Yolanda Cheek.    We discussed the intraoperative risks of bleeding and injury to vital organs, potential postoperative complications including, but not limited to, major respiratory events,  arrhythmia, bleeding, infection, reoperation, and death.   I explained the anticipated hospital course (+/- 1-3 days) and postoperative recovery including pain control, chest drain management, and variable degrees of dyspnea  (or need for supplemental oxygen) and fatigue that tend to get better with time.   Enoxaparin 40 mg subcutaneous in preop holding, pneumatic compression stockings   Intraoperative intercostal nerve block       They had all their questions answered and were in agreement with the plan.   "

## 2021-08-25 NOTE — PATIENT INSTRUCTIONS
Preparing for Your Surgery      Name:  Yolanda Cheek   MRN:  [id number]   :  1965   Today's Date:  2021       Arriving for surgery:  Surgery date:  21  Arrival time:  10:20AM    Restrictions due to COVID 19:       One visitor is allowed in the Pre Op area. When you go into surgery, one visitor is allowed to wait in the Surgery Waiting Room       (provided there is enough space to social distance).   After surgery- Two visitors are allowed at a time if you have a private room and one visitor is allowed for those in a semi-private room.   Every 4 days the visitor(s) can rotate. During the 4 day period, the visitor(s) must be consistent. No visitors under the age of 18 years old.   Visiting Hours: 8 am - 8:30 pm   No ill visitors.   All visitors must wear face mask.    [employer] parking is available for anyone with mobility limitations or disabilities.  ([place]  24 hours/ 7 days a week; [facility]  7 am- 3:30 pm, Mon- Fri)    Please come to:     [facility] [address]  [address] [place] [address]    When entering the hospital you will be asked COVID screening questions, you will then be directed to Registration.  Registration will direct you to the 3rd floor Surgery waiting room. Please ask if you need an escort or a wheelchair to the Surgery Waiting Room.  [phone]    What can I eat or drink?  -  You may eat and drink normally for up to 8 hours before your surgery. (Until 21, 4:20AM)  -  You may have clear liquids until 2 hours before surgery. (Until 21, 10:20AM)    Examples of clear liquids:  Water  Clear broth  Juices (apple, white grape, white cranberry  and cider) without pulp  Noncarbonated, powder based beverages  (lemonade and Damon-Aid)  Sodas (Sprite, 7-Up, ginger ale and seltzer)  Coffee or tea (without milk or cream)  Gatorade    -  No Alcohol for at least 24 hours before surgery     Which  medicines can I take?    Hold Aspirin for 7 days before surgery.   Hold Multivitamins for 7 days before surgery.  Hold Supplements for 7 days before surgery.  Hold Ibuprofen (Advil, Motrin) for 1 day before surgery--unless otherwise directed by surgeon.  Hold Naproxen (Aleve) for 4 days before surgery.    -  DO NOT take these medications the day of surgery:    Potassium   Vitamin B12    Magnesium Oxide  Vitamin C    -  PLEASE TAKE these medications the day of surgery:    Omeprazole(Prilosec)  Acetaminophen(Tylenol) as needed    How do I prepare myself?  - Please take 2 showers before surgery using Scrubcare or Hibiclens soap.    Use this soap only from the neck to your toes.     Leave the soap on your skin for one minute--then rinse thoroughly.      You may use your own shampoo and conditioner; no other hair products.   - Please remove all jewelry and body piercings.  - No lotions, deodorants or fragrance.  - No makeup or fingernail polish.   - Bring your ID and insurance card.    -If you have a Deep Brain Stimulator, Spinal Cord Stimulator or any neuro stimulator device---you must bring the remote control to the hospital     - All patients are required to have a Covid-19 test within 4 days of surgery/procedure.      -Patients will be contacted by the [facility] scheduling team within 1 week of surgery to make an appointment.      - Patients may call the Scheduling team at [phone] if they have not been scheduled within 4 days of  surgery.          Questions or Concerns:    - For any questions regarding the day of surgery or your hospital stay, please contact the Pre Admission Nursing Office at [phone].       - If you have health changes between today and your surgery please call your surgeon.       For questions after surgery please call your surgeons office.           Enhanced Recovery After Surgery     This is a team effort, including you, to get you back on your feet, eating and drinking normally  and out of the hospital as quickly as possible.  The goals are: 1) NO INFECTIONS and   2) RETURN TO NORMAL DIET    How can we achieve these goals?  1) STAY ACTIVE: Walk every day before your surgery; try to increase the amount every day.  Walk after surgery as much as you can-the nurses will help you.  Walking speeds healing and gets you home quicker, you heal better at home and have less risk of infection.     2) INCENTIVE SPIROMETER: Practice your incentive spirometer 4 times per day with 5 repetitions each time.  Using the incentive spirometer can strengthen your muscles between your ribs and help you have a strong cough after surgery.  A more effective cough can help prevent problems with your lungs.    3) STAY HYDRATED: Drink clear liquids up until 2 hours before your surgery. We would like you to purchase a drink such as Gatorade or Ensure Clear (not the milkshake type).  Drink this before bedtime and on the way into the hospital, drink between 8-10 ounces or until you feel hydrated.  Keeping well hydrated leads to your veins being plump, you wake up faster, and you are less likely to be nauseated. Start drinking water as soon as you can after surgery and advance to clear liquids and food as tolerated.  IV fluids contain salt, drinking fluids will minimize the amount of IV fluids you need and decrease the amount of salt you get.    The most common reason for the patient to be readmitted is dehydration. Staying hydrated after you go home from the hospital is very important.  Ensure or Ensure Clear are good options to keep you hydrated.     4) PAIN MANAGEMENT: If we minimize the amount of opioids and narcotics, and use regional blocks (which numb the area where your surgery is) along with oral pain medications; you will have less side effects of nausea and constipation. Narcotics can slow down your bowels and cause you to stay in the hospital longer.     Our goal is to keep you comfortable; eating and drinking  normally and back home safely.

## 2021-08-25 NOTE — H&P
Pre-Operative H & P     CC:  Preoperative exam to assess for increased cardiopulmonary risk while undergoing surgery and anesthesia.    Date of Encounter: 8/25/2021  Primary Care Physician:  Ariana Allison     Reason for visit:   Encounter Diagnoses   Name Primary?     Pre-op examination Yes     Lung mass        HPI  Yolanda Cheek is a 56 year old female who presents for pre-operative H & P in preparation for RIGHT THORACOSCOPIC MIDDLE LOBECTOMY, LYMPHADENECTOMY, MEDIASTINUM, EXTENDED, TRANSCERVICAL APPROACH with Dr. Knox on 8/27/21 at [facility].     The patient is a 56-year-old woman with a past medical history significant smoker, anal pain, constipation, GERD, anxiety, depression, alcohol and marijuana use, the patient was seen by pulmonology on 6/1/2021 after she had imaging for GI symptoms which showed a lung nodule.  She underwent EBUS on 6/16/21 and diagnosed with lung cancer. She met with oncology and radiation oncology. The patient met with Dr. Knox virtually on 7/21/21 and they discussed her diagnosis and treatment options. The patient is now scheduled for the procedure as above.     History is obtained from the patient and chart review      Hx of abnormal bleeding or anti-platelet use: none    Menstrual history: No LMP recorded. Patient has had a hysterectomy.:     Prior to Admission Medications  Current Outpatient Medications   Medication Sig Dispense Refill     cyanocobalamin (VITAMIN B-12) 100 MCG tablet every morning        magnesium oxide (MAG-OX) 400 (241.3 Mg) MG tablet Take 400 mg by mouth every morning        omeprazole (PRILOSEC) 40 MG DR capsule every morning        POTASSIUM PO Take by mouth every morning        traZODone (DESYREL) 50 MG tablet At Bedtime        vitamin C (ASCORBIC ACID) 250 MG tablet every morning        acetaminophen (TYLENOL) 325 MG tablet Take 2 tablets (650 mg) by mouth every 4 hours as needed for mild pain or  fever (Patient not taking: Reported on 2021) 100 tablet      cholecalciferol 2000 UNITS tablet Take 2,000 Units by mouth 2 times daily (Patient not taking: Reported on 2021) 180 tablet 3     gabapentin (NEURONTIN) 300 MG capsule TAKE 1 CAPSULE BY MOUTH 4 TIMES DAILY FOR 3 DAYS THEN ONE CAPSULE 3 TIMES DAILY FOR 1 DAY THEN ONE CAPSULE TWICE DAILY FOR 1 DAY THEN ONE CA (Patient not taking: Reported on 2021)       lidocaine (XYLOCAINE) 2 % solution USE 5 ML EVERY 6 HOURS AS DIRECTED BY DOCTOR AS NEEDED FOR PAIN. (Patient not taking: Reported on 2021)       ondansetron (ZOFRAN-ODT) 4 MG ODT tab DISSOLVE 1 TABLET IN MOUTH EVERY 4 HOURS AS NEEDED FOR NAUSEA (Patient not taking: Reported on 2021)         Family History  Family History   Problem Relation Age of Onset     Cerebrovascular Disease Mother      Alzheimer Disease Mother      Diabetes Father      Coronary Artery Disease Father      Alzheimer Disease Father      Rheumatoid Arthritis Sister      Anesthesia Reaction No family hx of      Bleeding Disorder No family hx of      Clotting Disorder No family hx of        The complete review of systems is negative other than noted in the HPI or here.     Preprocedure Note     Last edited 21 1353 by Kaylynn Gomez PA-C  Date of Service 21 1315  Creation Time 21 1315  Status: Addendum             Anesthesia Pre-Procedure Evaluation    Patient: Yolanda Cheek   MRN: [id number] : 1965        Preoperative Diagnosis: Lung mass [R91.8]   Procedure : Procedure(s):  RIGHT THORACOSCOPIC MIDDLE LOBECTOMY  LYMPHADENECTOMY, MEDIASTINUM, EXTENDED, TRANSCERVICAL APPROACH     Past Medical History:   Diagnosis Date     Alcohol abuse with intoxication (H) 2016     Anal pain      Anxiety      Chemical dependency (H) 2016     Constipation      Depression      Gastroesophageal reflux disease with esophagitis      Substance abuse (H) 2016    alcoholism      Past  "Surgical History:   Procedure Laterality Date     COLONOSCOPY  03/22/2021     DILATION & CURETTAGE DX & OR THERAPEUTIC       x3     ENDOBRONCHIAL ULTRASOUND  06/16/2021     GYN SURGERY      hysterectomy with one ovary removed.      HYSTERECTOMY, PAP NO LONGER INDICATED  01/01/2013     UPPER GI ENDOSCOPY  03/22/2021      Allergies   Allergen Reactions     Bee Anaphylaxis      Social History     Tobacco Use     Smoking status: Current Every Day Smoker   Substance Use Topics     Alcohol use: No     Alcohol/week: 0.0 standard drinks     Comment: Recovering alcoholic      Wt Readings from Last 1 Encounters:   12/05/16 69.9 kg (154 lb)        Anesthesia Evaluation   Pt has had prior anesthetic. Type: General and MAC.        ROS/MED HX  ENT/Pulmonary:     (+) tobacco use, Current use, 1 packs/day,     Neurologic:  - neg neurologic ROS  (-) no seizures, no CVA and no TIA   Cardiovascular:     (+) -----Previous cardiac testing   Echo: Date: Results:    Stress Test: Date: Results:    ECG Reviewed: Date: 7/12/21 Results:  Normal sinus rhythm, low voltage QRS  Cath: Date: Results:      METS/Exercise Tolerance: 4 - Raking leaves, gardening    Hematologic:  - neg hematologic  ROS  (-) history of blood clots, anemia and history of blood transfusion   Musculoskeletal:  - neg musculoskeletal ROS     GI/Hepatic: Comment: Anal pain    Constipation     (+) GERD, Asymptomatic on medication,     Renal/Genitourinary:  - neg Renal ROS     Endo:  - neg endo ROS     Psychiatric/Substance Use:     (+) psychiatric history anxiety alcohol abuse Recreational drug usage: Cannabis.    Infectious Disease:  - neg infectious disease ROS     Malignancy:   (+) Malignancy, History of Lung.  Lung CA Active status post.        Other:  - neg other ROS   (-) Any chance pregnant       /76 (BP Location: Left arm, Patient Position: Sitting, Cuff Size: Adult Regular)   Pulse 107   Temp 98.4  F (36.9  C) (Oral)   Resp 12   Ht 1.702 m (5' 7\")   Wt 65.1 " kg (143 lb 9.6 oz)   SpO2 96%   Breastfeeding No   BMI 22.49 kg/m           Physical Exam  Constitutional: Awake, alert, cooperative, no apparent distress, and appears stated age.  Eyes: Pupils equal, round and reactive to light, extra ocular muscles intact, sclera clear, conjunctiva normal.  HENT: Normocephalic, oral pharynx with moist mucus membranes, dentures. No goiter appreciated.   Respiratory: Clear to auscultation bilaterally, no crackles or wheezing.  Cardiovascular: Regular rate and rhythm, normal S1 and S2, and no murmur noted.  Carotids +2, no bruits. No edema. Palpable pulses to radial  DP and PT arteries.   GI: Normal bowel sounds, soft, non-distended, non-tender, no masses palpated, no hepatosplenomegaly.    Lymph/Hematologic: No cervical lymphadenopathy and no supraclavicular lymphadenopathy.  Genitourinary:  defer  Skin: Warm and dry.  No rashes at anticipated surgical site.   Musculoskeletal: Full ROM of neck. There is no redness, warmth, or swelling of the joints. Gross motor strength is normal.    Neurologic: Awake, alert, oriented to name, place and time. Cranial nerves II-XII are grossly intact. Gait is normal.   Neuropsychiatric: Calm, cooperative. Normal affect.     LABS  Results for CIERRA TAYLOR (MRN [id number]) as of 8/25/2021 14:55   Ref. Range 8/25/2021 14:21   Sodium Latest Ref Range: 133 - 144 mmol/L 142   Potassium Latest Ref Range: 3.4 - 5.3 mmol/L 4.1   Chloride Latest Ref Range: 94 - 109 mmol/L 104   Carbon Dioxide Latest Ref Range: 20 - 32 mmol/L 27   Urea Nitrogen Latest Ref Range: 7 - 30 mg/dL 4 (L)   Creatinine Latest Ref Range: 0.52 - 1.04 mg/dL 0.56   GFR Estimate Latest Ref Range: >60 mL/min/1.73m2 >90   Calcium Latest Ref Range: 8.5 - 10.1 mg/dL 9.0   Anion Gap Latest Ref Range: 3 - 14 mmol/L 11   Magnesium Latest Ref Range: 1.6 - 2.3 mg/dL 1.9   Albumin Latest Ref Range: 3.4 - 5.0 g/dL 3.4   Protein Total Latest Ref Range: 6.8 - 8.8 g/dL 6.9   Bilirubin Total Latest  Ref Range: 0.2 - 1.3 mg/dL 1.8 (H)   Alkaline Phosphatase Latest Ref Range: 40 - 150 U/L 103   ALT Latest Ref Range: 0 - 50 U/L 19   AST Latest Ref Range: 0 - 45 U/L 29   Glucose Latest Ref Range: 70 - 99 mg/dL 118 (H)   WBC Latest Ref Range: 4.0 - 11.0 10e3/uL 8.2   Hemoglobin Latest Ref Range: 11.7 - 15.7 g/dL 12.4   Hematocrit Latest Ref Range: 35.0 - 47.0 % 35.2   Platelet Count Latest Ref Range: 150 - 450 10e3/uL 301   RBC Count Latest Ref Range: 3.80 - 5.20 10e6/uL 3.37 (L)   MCV Latest Ref Range: 78 - 100 fL 105 (H)   MCH Latest Ref Range: 26.5 - 33.0 pg 36.8 (H)   MCHC Latest Ref Range: 31.5 - 36.5 g/dL 35.2   RDW Latest Ref Range: 10.0 - 15.0 % 15.0   INR Latest Ref Range: 0.85 - 1.15  1.13   Bilirubin slightly elevated but otherwise LFTs are within normal limits. Magnesium within normal limits.       EK21  Normal sinus rhythm, low voltage QRS    PFT Latest Ref Rng & Units 2021   FVC L 4.43   FEV1 L 3.05   FVC% % 129   FEV1% % 111       The patient's records and results personally reviewed by this provider.     Outside records reviewed from: care everywhere    ASSESSMENT and PLAN  Yolanda Cheek is a 56 year old female who is scheduled for RIGHT THORACOSCOPIC MIDDLE LOBECTOMY, LYMPHADENECTOMY, MEDIASTINUM, EXTENDED, TRANSCERVICAL APPROACH on 21 by Dr. Knox in treatment of Lung mass.  PAC referral for risk assessment and optimization for anesthesia with comorbid conditions of former smoker, anal pain, constipation, GERD, anxiety, depression, alcohol and marijuana use:    Pre-operative considerations:  1.  Cardiac:  Functional status- METS 4, the patient walks 4 blocks every single day.  High risk surgery with 0.9% (RCRI #) risk of major adverse cardiac event.   ~ The patient recently went to the ER for chest pain and had EKG showing normal sinus rhythm, low voltage QRS and normal troponins. It was felt that her symptoms were musculoskeletal and anxiety in nature. She has not had return  of symptoms. She has no risk factors. No further testing indicated.     2.  Pulm:  Airway feasible.  ANNELIESE risk: Low  ~ Smoker - formally smoked 1 ppd but patient reports she has now cut down. We discussed smoking cessation for the day of surgery which the patient agrees to do.   ~ She is not smoking marijuana    3. GI:  Risk of PONV score = 3.  If > 2, anti-emetic intervention recommended.  ~ Anal pain/ constipation - continue anusol  ~ GERD - Well controlled. Continue prilosec    4. Psych: anxiety/depression - continue trazodone  ~ History of alcoholism - the patient had withdrawal symptoms in June but now only has 2-3 drinks a day and last had a drink 4 days ago. She has gone weeks in the past without issues. We discussed no alcohol for 24 hours prior to surgery. Will check CMP and INR given alcohol use.     VTE risk: 1.8%      The patient is optimized for their procedure. AVS with information on surgery time/arrival time, meds and NPO status given by nursing staff.          On the day of service:     Prep time: 6 minutes  Visit time: 17 minutes  Documentation time: 8 minutes  ------------------------------------------  Total time: 31 minutes      Kaylynn Gomez PA-C  Preoperative Assessment Center  [facility]  Phone: [phone]  Fax: [phone]

## 2021-08-27 ENCOUNTER — APPOINTMENT (OUTPATIENT)
Dept: GENERAL RADIOLOGY | Facility: CLINIC | Age: 56
End: 2021-08-27
Attending: THORACIC SURGERY (CARDIOTHORACIC VASCULAR SURGERY)
Payer: COMMERCIAL

## 2021-08-27 ENCOUNTER — HOSPITAL ENCOUNTER (INPATIENT)
Facility: CLINIC | Age: 56
LOS: 5 days | Discharge: HOME | End: 2021-09-01
Attending: THORACIC SURGERY (CARDIOTHORACIC VASCULAR SURGERY) | Admitting: THORACIC SURGERY (CARDIOTHORACIC VASCULAR SURGERY)
Payer: COMMERCIAL

## 2021-08-27 ENCOUNTER — ANESTHESIA (OUTPATIENT)
Dept: SURGERY | Facility: CLINIC | Age: 56
End: 2021-08-27
Payer: COMMERCIAL

## 2021-08-27 DIAGNOSIS — R91.8 LUNG MASS: ICD-10-CM

## 2021-08-27 DIAGNOSIS — J44.9 CHRONIC OBSTRUCTIVE PULMONARY DISEASE, UNSPECIFIED COPD TYPE (H): Primary | ICD-10-CM

## 2021-08-27 LAB
ABO/RH(D): NORMAL
ANION GAP SERPL CALCULATED.3IONS-SCNC: 12 MMOL/L (ref 3–14)
ANTIBODY SCREEN: NEGATIVE
BASE EXCESS BLDA CALC-SCNC: -1.6 MMOL/L (ref -9–1.8)
BLD PROD TYP BPU: NORMAL
BLD PROD TYP BPU: NORMAL
BLOOD COMPONENT TYPE: NORMAL
BLOOD COMPONENT TYPE: NORMAL
BUN SERPL-MCNC: 8 MG/DL (ref 7–30)
CALCIUM SERPL-MCNC: 8.2 MG/DL (ref 8.5–10.1)
CHLORIDE BLD-SCNC: 105 MMOL/L (ref 94–109)
CO2 SERPL-SCNC: 20 MMOL/L (ref 20–32)
CODING SYSTEM: NORMAL
CODING SYSTEM: NORMAL
CREAT SERPL-MCNC: 0.58 MG/DL (ref 0.52–1.04)
CROSSMATCH: NORMAL
CROSSMATCH: NORMAL
ERYTHROCYTE [DISTWIDTH] IN BLOOD BY AUTOMATED COUNT: 15.4 % (ref 10–15)
GFR SERPL CREATININE-BSD FRML MDRD: >90 ML/MIN/1.73M2
GLUCOSE BLD-MCNC: 200 MG/DL (ref 70–99)
GLUCOSE BLDC GLUCOMTR-MCNC: 113 MG/DL (ref 70–99)
HCO3 BLD-SCNC: 24 MMOL/L (ref 21–28)
HCT VFR BLD AUTO: 32.1 % (ref 35–47)
HGB BLD-MCNC: 11.3 G/DL (ref 11.7–15.7)
ISSUE DATE AND TIME: NORMAL
LAB DIRECTOR COMMENTS: NORMAL
LAB DIRECTOR DISCLAIMER: NORMAL
LAB DIRECTOR INTERPRETATION: NORMAL
LAB DIRECTOR METHODOLOGY: NORMAL
LAB DIRECTOR RESULTS: NORMAL
MAGNESIUM SERPL-MCNC: 1.4 MG/DL (ref 1.6–2.3)
MCH RBC QN AUTO: 36.6 PG (ref 26.5–33)
MCHC RBC AUTO-ENTMCNC: 35.2 G/DL (ref 31.5–36.5)
MCV RBC AUTO: 104 FL (ref 78–100)
MDL NUMBER: NORMAL
O2/TOTAL GAS SETTING VFR VENT: 85 %
PCO2 BLD: 43 MM HG (ref 35–45)
PH BLD: 7.35 [PH] (ref 7.35–7.45)
PLATELET # BLD AUTO: 441 10E3/UL (ref 150–450)
PO2 BLD: 238 MM HG (ref 80–105)
POTASSIUM BLD-SCNC: 3.8 MMOL/L (ref 3.4–5.3)
RBC # BLD AUTO: 3.09 10E6/UL (ref 3.8–5.2)
SARS-COV-2 RNA RESP QL NAA+PROBE: NEGATIVE
SODIUM SERPL-SCNC: 137 MMOL/L (ref 133–144)
SPECIMEN DESCRIPTION: NORMAL
SPECIMEN EXPIRATION DATE: NORMAL
UNIT ABO/RH: NORMAL
UNIT ABO/RH: NORMAL
UNIT NUMBER: NORMAL
UNIT NUMBER: NORMAL
UNIT STATUS: NORMAL
UNIT STATUS: NORMAL
UNIT TYPE ISBT: 5100
UNIT TYPE ISBT: 5100
WBC # BLD AUTO: 27.7 10E3/UL (ref 4–11)

## 2021-08-27 PROCEDURE — 0BTD0ZZ RESECTION OF RIGHT MIDDLE LUNG LOBE, OPEN APPROACH: ICD-10-PCS | Performed by: THORACIC SURGERY (CARDIOTHORACIC VASCULAR SURGERY)

## 2021-08-27 PROCEDURE — 250N000011 HC RX IP 250 OP 636: Performed by: ANESTHESIOLOGY

## 2021-08-27 PROCEDURE — 82803 BLOOD GASES ANY COMBINATION: CPT | Performed by: THORACIC SURGERY (CARDIOTHORACIC VASCULAR SURGERY)

## 2021-08-27 PROCEDURE — 32674 THORACOSCOPY LYMPH NODE EXC: CPT | Mod: GC | Performed by: THORACIC SURGERY (CARDIOTHORACIC VASCULAR SURGERY)

## 2021-08-27 PROCEDURE — 250N000013 HC RX MED GY IP 250 OP 250 PS 637: Performed by: PHYSICIAN ASSISTANT

## 2021-08-27 PROCEDURE — 83735 ASSAY OF MAGNESIUM: CPT | Performed by: STUDENT IN AN ORGANIZED HEALTH CARE EDUCATION/TRAINING PROGRAM

## 2021-08-27 PROCEDURE — 250N000011 HC RX IP 250 OP 636: Performed by: NURSE ANESTHETIST, CERTIFIED REGISTERED

## 2021-08-27 PROCEDURE — 250N000011 HC RX IP 250 OP 636: Performed by: THORACIC SURGERY (CARDIOTHORACIC VASCULAR SURGERY)

## 2021-08-27 PROCEDURE — 32486 SLEEVE LOBECTOMY: CPT | Mod: RT | Performed by: THORACIC SURGERY (CARDIOTHORACIC VASCULAR SURGERY)

## 2021-08-27 PROCEDURE — 86923 COMPATIBILITY TEST ELECTRIC: CPT | Performed by: THORACIC SURGERY (CARDIOTHORACIC VASCULAR SURGERY)

## 2021-08-27 PROCEDURE — 88331 PATH CONSLTJ SURG 1 BLK 1SPC: CPT | Mod: TC | Performed by: THORACIC SURGERY (CARDIOTHORACIC VASCULAR SURGERY)

## 2021-08-27 PROCEDURE — 0BJ08ZZ INSPECTION OF TRACHEOBRONCHIAL TREE, VIA NATURAL OR ARTIFICIAL OPENING ENDOSCOPIC: ICD-10-PCS | Performed by: THORACIC SURGERY (CARDIOTHORACIC VASCULAR SURGERY)

## 2021-08-27 PROCEDURE — 250N000011 HC RX IP 250 OP 636

## 2021-08-27 PROCEDURE — 250N000009 HC RX 250: Performed by: STUDENT IN AN ORGANIZED HEALTH CARE EDUCATION/TRAINING PROGRAM

## 2021-08-27 PROCEDURE — 360N000077 HC SURGERY LEVEL 4, PER MIN: Performed by: THORACIC SURGERY (CARDIOTHORACIC VASCULAR SURGERY)

## 2021-08-27 PROCEDURE — 120N000002 HC R&B MED SURG/OB UMMC

## 2021-08-27 PROCEDURE — 250N000009 HC RX 250: Performed by: THORACIC SURGERY (CARDIOTHORACIC VASCULAR SURGERY)

## 2021-08-27 PROCEDURE — 370N000017 HC ANESTHESIA TECHNICAL FEE, PER MIN: Performed by: THORACIC SURGERY (CARDIOTHORACIC VASCULAR SURGERY)

## 2021-08-27 PROCEDURE — 250N000013 HC RX MED GY IP 250 OP 250 PS 637: Performed by: STUDENT IN AN ORGANIZED HEALTH CARE EDUCATION/TRAINING PROGRAM

## 2021-08-27 PROCEDURE — 80048 BASIC METABOLIC PNL TOTAL CA: CPT | Performed by: STUDENT IN AN ORGANIZED HEALTH CARE EDUCATION/TRAINING PROGRAM

## 2021-08-27 PROCEDURE — 999N000141 HC STATISTIC PRE-PROCEDURE NURSING ASSESSMENT: Performed by: THORACIC SURGERY (CARDIOTHORACIC VASCULAR SURGERY)

## 2021-08-27 PROCEDURE — 71045 X-RAY EXAM CHEST 1 VIEW: CPT | Mod: 26

## 2021-08-27 PROCEDURE — 250N000011 HC RX IP 250 OP 636: Performed by: STUDENT IN AN ORGANIZED HEALTH CARE EDUCATION/TRAINING PROGRAM

## 2021-08-27 PROCEDURE — 258N000003 HC RX IP 258 OP 636: Performed by: NURSE ANESTHETIST, CERTIFIED REGISTERED

## 2021-08-27 PROCEDURE — 272N000001 HC OR GENERAL SUPPLY STERILE: Performed by: THORACIC SURGERY (CARDIOTHORACIC VASCULAR SURGERY)

## 2021-08-27 PROCEDURE — 999N000065 XR CHEST PORT 1 VIEW

## 2021-08-27 PROCEDURE — C2618 PROBE/NEEDLE, CRYO: HCPCS | Performed by: THORACIC SURGERY (CARDIOTHORACIC VASCULAR SURGERY)

## 2021-08-27 PROCEDURE — 85027 COMPLETE CBC AUTOMATED: CPT | Performed by: STUDENT IN AN ORGANIZED HEALTH CARE EDUCATION/TRAINING PROGRAM

## 2021-08-27 PROCEDURE — U0005 INFEC AGEN DETEC AMPLI PROBE: HCPCS | Performed by: ANESTHESIOLOGY

## 2021-08-27 PROCEDURE — 88305 TISSUE EXAM BY PATHOLOGIST: CPT | Mod: TC | Performed by: THORACIC SURGERY (CARDIOTHORACIC VASCULAR SURGERY)

## 2021-08-27 PROCEDURE — 250N000009 HC RX 250: Performed by: NURSE ANESTHETIST, CERTIFIED REGISTERED

## 2021-08-27 PROCEDURE — 258N000003 HC RX IP 258 OP 636: Performed by: ANESTHESIOLOGY

## 2021-08-27 PROCEDURE — 250N000025 HC SEVOFLURANE, PER MIN: Performed by: THORACIC SURGERY (CARDIOTHORACIC VASCULAR SURGERY)

## 2021-08-27 PROCEDURE — 86901 BLOOD TYPING SEROLOGIC RH(D): CPT | Performed by: THORACIC SURGERY (CARDIOTHORACIC VASCULAR SURGERY)

## 2021-08-27 PROCEDURE — 710N000010 HC RECOVERY PHASE 1, LEVEL 2, PER MIN: Performed by: THORACIC SURGERY (CARDIOTHORACIC VASCULAR SURGERY)

## 2021-08-27 PROCEDURE — 07B74ZZ EXCISION OF THORAX LYMPHATIC, PERCUTANEOUS ENDOSCOPIC APPROACH: ICD-10-PCS | Performed by: THORACIC SURGERY (CARDIOTHORACIC VASCULAR SURGERY)

## 2021-08-27 PROCEDURE — 86850 RBC ANTIBODY SCREEN: CPT | Performed by: THORACIC SURGERY (CARDIOTHORACIC VASCULAR SURGERY)

## 2021-08-27 RX ORDER — HYDROMORPHONE HCL IN WATER/PF 6 MG/30 ML
0.4 PATIENT CONTROLLED ANALGESIA SYRINGE INTRAVENOUS EVERY 5 MIN PRN
Status: DISCONTINUED | OUTPATIENT
Start: 2021-08-27 | End: 2021-08-27

## 2021-08-27 RX ORDER — GABAPENTIN 100 MG/1
100 CAPSULE ORAL 3 TIMES DAILY
Status: DISCONTINUED | OUTPATIENT
Start: 2021-08-27 | End: 2021-09-01 | Stop reason: HOSPADM

## 2021-08-27 RX ORDER — SODIUM CHLORIDE, SODIUM LACTATE, POTASSIUM CHLORIDE, CALCIUM CHLORIDE 600; 310; 30; 20 MG/100ML; MG/100ML; MG/100ML; MG/100ML
INJECTION, SOLUTION INTRAVENOUS CONTINUOUS
Status: DISCONTINUED | OUTPATIENT
Start: 2021-08-27 | End: 2021-08-27 | Stop reason: HOSPADM

## 2021-08-27 RX ORDER — HYDROMORPHONE HCL IN WATER/PF 6 MG/30 ML
0.2 PATIENT CONTROLLED ANALGESIA SYRINGE INTRAVENOUS
Status: DISCONTINUED | OUTPATIENT
Start: 2021-08-27 | End: 2021-08-29

## 2021-08-27 RX ORDER — POLYETHYLENE GLYCOL 3350 17 G/17G
17 POWDER, FOR SOLUTION ORAL DAILY
Status: DISCONTINUED | OUTPATIENT
Start: 2021-08-28 | End: 2021-09-01 | Stop reason: HOSPADM

## 2021-08-27 RX ORDER — LIDOCAINE HYDROCHLORIDE 20 MG/ML
INJECTION, SOLUTION INFILTRATION; PERINEURAL PRN
Status: DISCONTINUED | OUTPATIENT
Start: 2021-08-27 | End: 2021-08-27

## 2021-08-27 RX ORDER — OXYCODONE HYDROCHLORIDE 5 MG/1
5 TABLET ORAL EVERY 4 HOURS PRN
Status: DISCONTINUED | OUTPATIENT
Start: 2021-08-27 | End: 2021-08-27 | Stop reason: HOSPADM

## 2021-08-27 RX ORDER — ONDANSETRON 2 MG/ML
INJECTION INTRAMUSCULAR; INTRAVENOUS PRN
Status: DISCONTINUED | OUTPATIENT
Start: 2021-08-27 | End: 2021-08-27

## 2021-08-27 RX ORDER — LABETALOL HYDROCHLORIDE 5 MG/ML
10 INJECTION, SOLUTION INTRAVENOUS
Status: DISCONTINUED | OUTPATIENT
Start: 2021-08-27 | End: 2021-08-27 | Stop reason: HOSPADM

## 2021-08-27 RX ORDER — CHLORHEXIDINE GLUCONATE ORAL RINSE 1.2 MG/ML
15 SOLUTION DENTAL ONCE
Status: COMPLETED | OUTPATIENT
Start: 2021-08-27 | End: 2021-08-27

## 2021-08-27 RX ORDER — AMOXICILLIN 250 MG
2 CAPSULE ORAL 2 TIMES DAILY
Status: DISCONTINUED | OUTPATIENT
Start: 2021-08-27 | End: 2021-08-29

## 2021-08-27 RX ORDER — FENTANYL CITRATE 50 UG/ML
INJECTION, SOLUTION INTRAMUSCULAR; INTRAVENOUS PRN
Status: DISCONTINUED | OUTPATIENT
Start: 2021-08-27 | End: 2021-08-27

## 2021-08-27 RX ORDER — ACETAMINOPHEN 325 MG/1
650 TABLET ORAL EVERY 4 HOURS PRN
Status: DISCONTINUED | OUTPATIENT
Start: 2021-08-30 | End: 2021-08-29

## 2021-08-27 RX ORDER — BUPIVACAINE HYDROCHLORIDE AND EPINEPHRINE 5; 5 MG/ML; UG/ML
INJECTION, SOLUTION PERINEURAL PRN
Status: DISCONTINUED | OUTPATIENT
Start: 2021-08-27 | End: 2021-08-27 | Stop reason: HOSPADM

## 2021-08-27 RX ORDER — GABAPENTIN 300 MG/1
300 CAPSULE ORAL
Status: COMPLETED | OUTPATIENT
Start: 2021-08-27 | End: 2021-08-27

## 2021-08-27 RX ORDER — ACETAMINOPHEN 325 MG/1
975 TABLET ORAL ONCE
Status: COMPLETED | OUTPATIENT
Start: 2021-08-27 | End: 2021-08-27

## 2021-08-27 RX ORDER — LIDOCAINE 40 MG/G
CREAM TOPICAL
Status: DISCONTINUED | OUTPATIENT
Start: 2021-08-27 | End: 2021-08-27 | Stop reason: HOSPADM

## 2021-08-27 RX ORDER — GLYCOPYRROLATE 0.2 MG/ML
INJECTION, SOLUTION INTRAMUSCULAR; INTRAVENOUS PRN
Status: DISCONTINUED | OUTPATIENT
Start: 2021-08-27 | End: 2021-08-27

## 2021-08-27 RX ORDER — OXYCODONE HYDROCHLORIDE 5 MG/1
5 TABLET ORAL EVERY 4 HOURS PRN
Status: DISCONTINUED | OUTPATIENT
Start: 2021-08-27 | End: 2021-08-29

## 2021-08-27 RX ORDER — SODIUM CHLORIDE, SODIUM LACTATE, POTASSIUM CHLORIDE, CALCIUM CHLORIDE 600; 310; 30; 20 MG/100ML; MG/100ML; MG/100ML; MG/100ML
INJECTION, SOLUTION INTRAVENOUS CONTINUOUS PRN
Status: DISCONTINUED | OUTPATIENT
Start: 2021-08-27 | End: 2021-08-27

## 2021-08-27 RX ORDER — HYDROMORPHONE HCL IN WATER/PF 6 MG/30 ML
0.4 PATIENT CONTROLLED ANALGESIA SYRINGE INTRAVENOUS
Status: DISCONTINUED | OUTPATIENT
Start: 2021-08-27 | End: 2021-08-29

## 2021-08-27 RX ORDER — OXYCODONE HYDROCHLORIDE 10 MG/1
10 TABLET ORAL EVERY 4 HOURS PRN
Status: DISCONTINUED | OUTPATIENT
Start: 2021-08-27 | End: 2021-08-29

## 2021-08-27 RX ORDER — NALOXONE HYDROCHLORIDE 0.4 MG/ML
0.4 INJECTION, SOLUTION INTRAMUSCULAR; INTRAVENOUS; SUBCUTANEOUS
Status: DISCONTINUED | OUTPATIENT
Start: 2021-08-27 | End: 2021-09-01 | Stop reason: HOSPADM

## 2021-08-27 RX ORDER — HYDRALAZINE HYDROCHLORIDE 20 MG/ML
2.5-5 INJECTION INTRAMUSCULAR; INTRAVENOUS EVERY 10 MIN PRN
Status: DISCONTINUED | OUTPATIENT
Start: 2021-08-27 | End: 2021-08-27 | Stop reason: HOSPADM

## 2021-08-27 RX ORDER — ONDANSETRON 4 MG/1
4 TABLET, ORALLY DISINTEGRATING ORAL EVERY 30 MIN PRN
Status: DISCONTINUED | OUTPATIENT
Start: 2021-08-27 | End: 2021-08-27 | Stop reason: HOSPADM

## 2021-08-27 RX ORDER — AMOXICILLIN 250 MG
1 CAPSULE ORAL 2 TIMES DAILY
Status: DISCONTINUED | OUTPATIENT
Start: 2021-08-27 | End: 2021-08-29

## 2021-08-27 RX ORDER — CELECOXIB 200 MG/1
200 CAPSULE ORAL ONCE
Status: COMPLETED | OUTPATIENT
Start: 2021-08-27 | End: 2021-08-27

## 2021-08-27 RX ORDER — PROPOFOL 10 MG/ML
INJECTION, EMULSION INTRAVENOUS PRN
Status: DISCONTINUED | OUTPATIENT
Start: 2021-08-27 | End: 2021-08-27

## 2021-08-27 RX ORDER — MEPERIDINE HYDROCHLORIDE 25 MG/ML
12.5 INJECTION INTRAMUSCULAR; INTRAVENOUS; SUBCUTANEOUS
Status: DISCONTINUED | OUTPATIENT
Start: 2021-08-27 | End: 2021-08-27 | Stop reason: HOSPADM

## 2021-08-27 RX ORDER — ALBUTEROL SULFATE 90 UG/1
4 AEROSOL, METERED RESPIRATORY (INHALATION) 4 TIMES DAILY
Status: DISCONTINUED | OUTPATIENT
Start: 2021-08-28 | End: 2021-09-01 | Stop reason: HOSPADM

## 2021-08-27 RX ORDER — ONDANSETRON 2 MG/ML
4 INJECTION INTRAMUSCULAR; INTRAVENOUS EVERY 6 HOURS PRN
Status: DISCONTINUED | OUTPATIENT
Start: 2021-08-27 | End: 2021-09-01 | Stop reason: HOSPADM

## 2021-08-27 RX ORDER — ALBUTEROL SULFATE 0.83 MG/ML
2.5 SOLUTION RESPIRATORY (INHALATION) EVERY 4 HOURS PRN
Status: DISCONTINUED | OUTPATIENT
Start: 2021-08-27 | End: 2021-08-27 | Stop reason: HOSPADM

## 2021-08-27 RX ORDER — CEFAZOLIN SODIUM 2 G/100ML
2 INJECTION, SOLUTION INTRAVENOUS SEE ADMIN INSTRUCTIONS
Status: DISCONTINUED | OUTPATIENT
Start: 2021-08-27 | End: 2021-08-27 | Stop reason: HOSPADM

## 2021-08-27 RX ORDER — FENTANYL CITRATE 50 UG/ML
50 INJECTION, SOLUTION INTRAMUSCULAR; INTRAVENOUS EVERY 5 MIN PRN
Status: DISCONTINUED | OUTPATIENT
Start: 2021-08-27 | End: 2021-08-27 | Stop reason: HOSPADM

## 2021-08-27 RX ORDER — ONDANSETRON 2 MG/ML
4 INJECTION INTRAMUSCULAR; INTRAVENOUS EVERY 30 MIN PRN
Status: DISCONTINUED | OUTPATIENT
Start: 2021-08-27 | End: 2021-08-27 | Stop reason: HOSPADM

## 2021-08-27 RX ORDER — TRAZODONE HYDROCHLORIDE 50 MG/1
50 TABLET, FILM COATED ORAL AT BEDTIME
Status: DISCONTINUED | OUTPATIENT
Start: 2021-08-27 | End: 2021-09-01 | Stop reason: HOSPADM

## 2021-08-27 RX ORDER — DIPHENHYDRAMINE HYDROCHLORIDE 50 MG/ML
25 INJECTION INTRAMUSCULAR; INTRAVENOUS EVERY 6 HOURS PRN
Status: DISCONTINUED | OUTPATIENT
Start: 2021-08-27 | End: 2021-09-01 | Stop reason: HOSPADM

## 2021-08-27 RX ORDER — LABETALOL HYDROCHLORIDE 5 MG/ML
5 INJECTION, SOLUTION INTRAVENOUS EVERY 5 MIN PRN
Status: DISCONTINUED | OUTPATIENT
Start: 2021-08-27 | End: 2021-08-27 | Stop reason: HOSPADM

## 2021-08-27 RX ORDER — ACETAMINOPHEN 325 MG/1
975 TABLET ORAL EVERY 8 HOURS
Status: DISCONTINUED | OUTPATIENT
Start: 2021-08-27 | End: 2021-08-29

## 2021-08-27 RX ORDER — ONDANSETRON 4 MG/1
4 TABLET, ORALLY DISINTEGRATING ORAL EVERY 6 HOURS PRN
Status: DISCONTINUED | OUTPATIENT
Start: 2021-08-27 | End: 2021-09-01 | Stop reason: HOSPADM

## 2021-08-27 RX ORDER — NALOXONE HYDROCHLORIDE 0.4 MG/ML
0.2 INJECTION, SOLUTION INTRAMUSCULAR; INTRAVENOUS; SUBCUTANEOUS
Status: DISCONTINUED | OUTPATIENT
Start: 2021-08-27 | End: 2021-09-01 | Stop reason: HOSPADM

## 2021-08-27 RX ORDER — CEFAZOLIN SODIUM 2 G/100ML
2 INJECTION, SOLUTION INTRAVENOUS
Status: COMPLETED | OUTPATIENT
Start: 2021-08-27 | End: 2021-08-27

## 2021-08-27 RX ORDER — AMOXICILLIN 250 MG
1 CAPSULE ORAL 2 TIMES DAILY
Status: DISCONTINUED | OUTPATIENT
Start: 2021-08-28 | End: 2021-08-29

## 2021-08-27 RX ORDER — PHENYLEPHRINE HCL IN 0.9% NACL 50MG/250ML
.5-1.25 PLASTIC BAG, INJECTION (ML) INTRAVENOUS CONTINUOUS
Status: DISCONTINUED | OUTPATIENT
Start: 2021-08-27 | End: 2021-08-28

## 2021-08-27 RX ORDER — DEXAMETHASONE SODIUM PHOSPHATE 4 MG/ML
INJECTION, SOLUTION INTRA-ARTICULAR; INTRALESIONAL; INTRAMUSCULAR; INTRAVENOUS; SOFT TISSUE PRN
Status: DISCONTINUED | OUTPATIENT
Start: 2021-08-27 | End: 2021-08-27

## 2021-08-27 RX ORDER — METOPROLOL TARTRATE 1 MG/ML
INJECTION, SOLUTION INTRAVENOUS PRN
Status: DISCONTINUED | OUTPATIENT
Start: 2021-08-27 | End: 2021-08-27

## 2021-08-27 RX ORDER — DIPHENHYDRAMINE HCL 25 MG
25 CAPSULE ORAL EVERY 6 HOURS PRN
Status: DISCONTINUED | OUTPATIENT
Start: 2021-08-27 | End: 2021-09-01 | Stop reason: HOSPADM

## 2021-08-27 RX ORDER — FENTANYL CITRATE 50 UG/ML
25 INJECTION, SOLUTION INTRAMUSCULAR; INTRAVENOUS EVERY 5 MIN PRN
Status: DISCONTINUED | OUTPATIENT
Start: 2021-08-27 | End: 2021-08-27 | Stop reason: HOSPADM

## 2021-08-27 RX ADMIN — ROCURONIUM BROMIDE 10 MG: 10 INJECTION INTRAVENOUS at 18:48

## 2021-08-27 RX ADMIN — PHENYLEPHRINE HYDROCHLORIDE 200 MCG: 10 INJECTION INTRAVENOUS at 20:29

## 2021-08-27 RX ADMIN — SODIUM CHLORIDE, POTASSIUM CHLORIDE, SODIUM LACTATE AND CALCIUM CHLORIDE: 600; 310; 30; 20 INJECTION, SOLUTION INTRAVENOUS at 15:19

## 2021-08-27 RX ADMIN — LIDOCAINE HYDROCHLORIDE 80 MG: 20 INJECTION, SOLUTION INFILTRATION; PERINEURAL at 15:26

## 2021-08-27 RX ADMIN — Medication 2 G: at 16:08

## 2021-08-27 RX ADMIN — PHENYLEPHRINE HYDROCHLORIDE 100 MCG: 10 INJECTION INTRAVENOUS at 20:15

## 2021-08-27 RX ADMIN — PHENYLEPHRINE HYDROCHLORIDE 100 MCG: 10 INJECTION INTRAVENOUS at 20:18

## 2021-08-27 RX ADMIN — PHENYLEPHRINE HYDROCHLORIDE 200 MCG: 10 INJECTION INTRAVENOUS at 18:57

## 2021-08-27 RX ADMIN — HYDROMORPHONE HYDROCHLORIDE 0.5 MG: 1 INJECTION, SOLUTION INTRAMUSCULAR; INTRAVENOUS; SUBCUTANEOUS at 20:44

## 2021-08-27 RX ADMIN — GABAPENTIN 100 MG: 100 CAPSULE ORAL at 23:00

## 2021-08-27 RX ADMIN — HYDROMORPHONE HYDROCHLORIDE 0.4 MG: 1 INJECTION, SOLUTION INTRAMUSCULAR; INTRAVENOUS; SUBCUTANEOUS at 22:39

## 2021-08-27 RX ADMIN — METOPROLOL TARTRATE 2 MG: 5 INJECTION INTRAVENOUS at 16:28

## 2021-08-27 RX ADMIN — FENTANYL CITRATE 50 MCG: 50 INJECTION, SOLUTION INTRAMUSCULAR; INTRAVENOUS at 21:51

## 2021-08-27 RX ADMIN — FENTANYL CITRATE 50 MCG: 50 INJECTION, SOLUTION INTRAMUSCULAR; INTRAVENOUS at 21:05

## 2021-08-27 RX ADMIN — MIDAZOLAM 2 MG: 1 INJECTION INTRAMUSCULAR; INTRAVENOUS at 15:19

## 2021-08-27 RX ADMIN — FENTANYL CITRATE 100 MCG: 50 INJECTION, SOLUTION INTRAMUSCULAR; INTRAVENOUS at 15:54

## 2021-08-27 RX ADMIN — FENTANYL CITRATE 50 MCG: 50 INJECTION, SOLUTION INTRAMUSCULAR; INTRAVENOUS at 21:46

## 2021-08-27 RX ADMIN — METOPROLOL TARTRATE 1 MG: 5 INJECTION INTRAVENOUS at 16:30

## 2021-08-27 RX ADMIN — DEXAMETHASONE SODIUM PHOSPHATE 4 MG: 4 INJECTION, SOLUTION INTRA-ARTICULAR; INTRALESIONAL; INTRAMUSCULAR; INTRAVENOUS; SOFT TISSUE at 15:55

## 2021-08-27 RX ADMIN — Medication 2 G: at 20:07

## 2021-08-27 RX ADMIN — Medication 0.5 MCG/KG/MIN: at 19:10

## 2021-08-27 RX ADMIN — PROPOFOL 150 MG: 10 INJECTION, EMULSION INTRAVENOUS at 15:26

## 2021-08-27 RX ADMIN — HYDROMORPHONE HYDROCHLORIDE 0.4 MG: 1 INJECTION, SOLUTION INTRAMUSCULAR; INTRAVENOUS; SUBCUTANEOUS at 22:19

## 2021-08-27 RX ADMIN — FENTANYL CITRATE 50 MCG: 50 INJECTION, SOLUTION INTRAMUSCULAR; INTRAVENOUS at 22:09

## 2021-08-27 RX ADMIN — ROCURONIUM BROMIDE 10 MG: 10 INJECTION INTRAVENOUS at 16:06

## 2021-08-27 RX ADMIN — ONDANSETRON 4 MG: 2 INJECTION INTRAMUSCULAR; INTRAVENOUS at 21:06

## 2021-08-27 RX ADMIN — ROCURONIUM BROMIDE 20 MG: 10 INJECTION INTRAVENOUS at 17:33

## 2021-08-27 RX ADMIN — ROCURONIUM BROMIDE 20 MG: 10 INJECTION INTRAVENOUS at 19:17

## 2021-08-27 RX ADMIN — PHENYLEPHRINE HYDROCHLORIDE 200 MCG: 10 INJECTION INTRAVENOUS at 20:56

## 2021-08-27 RX ADMIN — ENOXAPARIN SODIUM 40 MG: 40 INJECTION SUBCUTANEOUS at 10:57

## 2021-08-27 RX ADMIN — ROCURONIUM BROMIDE 10 MG: 10 INJECTION INTRAVENOUS at 17:09

## 2021-08-27 RX ADMIN — ROCURONIUM BROMIDE 50 MG: 10 INJECTION INTRAVENOUS at 15:26

## 2021-08-27 RX ADMIN — ACETAMINOPHEN 975 MG: 325 TABLET, FILM COATED ORAL at 22:55

## 2021-08-27 RX ADMIN — CHLORHEXIDINE GLUCONATE 15 ML: 1.2 SOLUTION ORAL at 10:57

## 2021-08-27 RX ADMIN — PROPOFOL 30 MG: 10 INJECTION, EMULSION INTRAVENOUS at 16:15

## 2021-08-27 RX ADMIN — ACETAMINOPHEN 975 MG: 325 TABLET, FILM COATED ORAL at 10:57

## 2021-08-27 RX ADMIN — PHENYLEPHRINE HYDROCHLORIDE 100 MCG: 10 INJECTION INTRAVENOUS at 18:52

## 2021-08-27 RX ADMIN — Medication: at 23:01

## 2021-08-27 RX ADMIN — FENTANYL CITRATE 50 MCG: 50 INJECTION, SOLUTION INTRAMUSCULAR; INTRAVENOUS at 18:03

## 2021-08-27 RX ADMIN — SODIUM CHLORIDE, POTASSIUM CHLORIDE, SODIUM LACTATE AND CALCIUM CHLORIDE: 600; 310; 30; 20 INJECTION, SOLUTION INTRAVENOUS at 15:39

## 2021-08-27 RX ADMIN — ROCURONIUM BROMIDE 10 MG: 10 INJECTION INTRAVENOUS at 16:38

## 2021-08-27 RX ADMIN — FENTANYL CITRATE 150 MCG: 50 INJECTION, SOLUTION INTRAMUSCULAR; INTRAVENOUS at 16:16

## 2021-08-27 RX ADMIN — PHENYLEPHRINE HYDROCHLORIDE 100 MCG: 10 INJECTION INTRAVENOUS at 18:42

## 2021-08-27 RX ADMIN — PHENYLEPHRINE HYDROCHLORIDE 100 MCG: 10 INJECTION INTRAVENOUS at 16:52

## 2021-08-27 RX ADMIN — ROCURONIUM BROMIDE 10 MG: 10 INJECTION INTRAVENOUS at 19:53

## 2021-08-27 RX ADMIN — CELECOXIB 200 MG: 200 CAPSULE ORAL at 10:57

## 2021-08-27 RX ADMIN — GABAPENTIN 300 MG: 300 CAPSULE ORAL at 10:57

## 2021-08-27 RX ADMIN — FENTANYL CITRATE 150 MCG: 50 INJECTION, SOLUTION INTRAMUSCULAR; INTRAVENOUS at 15:23

## 2021-08-27 RX ADMIN — FENTANYL CITRATE 50 MCG: 50 INJECTION, SOLUTION INTRAMUSCULAR; INTRAVENOUS at 22:00

## 2021-08-27 RX ADMIN — GLYCOPYRROLATE 0.1 MG: 0.2 INJECTION, SOLUTION INTRAMUSCULAR; INTRAVENOUS at 16:09

## 2021-08-27 RX ADMIN — SUGAMMADEX 150 MG: 100 INJECTION, SOLUTION INTRAVENOUS at 21:13

## 2021-08-27 ASSESSMENT — MIFFLIN-ST. JEOR: SCORE: 1292.63

## 2021-08-27 NOTE — ANESTHESIA PROCEDURE NOTES
Airway       Patient location during procedure: OR       Procedure Start/Stop Times: 8/27/2021 3:29 PM  Staff -        CRNA: Roberto Chang APRN CRNA       Performed By: CRNAIndications and Patient Condition       Indications for airway management: niru-procedural        Final Airway Details       Final airway type: endotracheal airway       Successful airway: ETT - double lumen left  Endotracheal Airway Details        Cuffed: yes       Successful intubation technique: direct laryngoscopy       DL Blade Type: MAC 3       Grade View of Cords: 1       Position: Center       ETT Double lumen (fr): 37    Post intubation assessment        Placement verified by: capnometry and equal breath sounds        Number of attempts at approach: 1       Secured with: silk tape       Ease of procedure: easy

## 2021-08-27 NOTE — PROGRESS NOTES
SPIRITUAL HEALTH SERVICES  [facility] ([place]) 3C   PRE-SURGERY VISIT    Had pre-surgery visit with pt.  Provided spiritual support, prayer. Pt identifies as Hinduism. Pt's sister was her in support.    Rev. Sara Agrawal MDiv, [facility]  Staff    Pager [phone]

## 2021-08-27 NOTE — ANESTHESIA PROCEDURE NOTES
Arterial Line Procedure Note  Pre-Procedure   Staff -        Anesthesiologist:  Vijay Pagan MD       Performed By: anesthesiologist       Location: OR       Procedure Start/Stop Times: 8/27/2021 3:28 PM and 8/27/2021 3:31 PM       Pre-Anesthestic Checklist: patient identified, IV checked, risks and benefits discussed, informed consent, monitors and equipment checked, pre-op evaluation and at physician/surgeon's request  Timeout:       Correct Patient: Yes        Correct Procedure: Yes        Correct Site: Yes        Correct Position: Yes   Procedure   Procedure: arterial line       Laterality: left       Insertion Site: radial.  Sterile Prep        Standard elements of sterile barrier followed       Skin prep: Chloraprep  Insertion/Injection        Technique: Seldinger Technique        Catheter Type/Size: 20 G, 1.75 in/4.5 cm quick cath (integral wire)  Narrative        Tegaderm dressing used.       Complications: None apparent,        Arterial waveform: Yes        IBP within 10% of NIBP: Yes

## 2021-08-28 ENCOUNTER — APPOINTMENT (OUTPATIENT)
Dept: OCCUPATIONAL THERAPY | Facility: CLINIC | Age: 56
End: 2021-08-28
Attending: THORACIC SURGERY (CARDIOTHORACIC VASCULAR SURGERY)
Payer: COMMERCIAL

## 2021-08-28 ENCOUNTER — APPOINTMENT (OUTPATIENT)
Dept: GENERAL RADIOLOGY | Facility: CLINIC | Age: 56
End: 2021-08-28
Attending: THORACIC SURGERY (CARDIOTHORACIC VASCULAR SURGERY)
Payer: COMMERCIAL

## 2021-08-28 LAB
ANION GAP SERPL CALCULATED.3IONS-SCNC: 7 MMOL/L (ref 3–14)
BUN SERPL-MCNC: 10 MG/DL (ref 7–30)
CALCIUM SERPL-MCNC: 8 MG/DL (ref 8.5–10.1)
CHLORIDE BLD-SCNC: 104 MMOL/L (ref 94–109)
CO2 SERPL-SCNC: 25 MMOL/L (ref 20–32)
CREAT SERPL-MCNC: 0.56 MG/DL (ref 0.52–1.04)
ERYTHROCYTE [DISTWIDTH] IN BLOOD BY AUTOMATED COUNT: 15.1 % (ref 10–15)
GFR SERPL CREATININE-BSD FRML MDRD: >90 ML/MIN/1.73M2
GLUCOSE BLD-MCNC: 132 MG/DL (ref 70–99)
GLUCOSE BLDC GLUCOMTR-MCNC: 134 MG/DL (ref 70–99)
HCT VFR BLD AUTO: 27 % (ref 35–47)
HGB BLD-MCNC: 9.4 G/DL (ref 11.7–15.7)
HOLD SPECIMEN: NORMAL
LACTATE SERPL-SCNC: 1 MMOL/L (ref 0.7–2)
MAGNESIUM SERPL-MCNC: 1.5 MG/DL (ref 1.6–2.3)
MCH RBC QN AUTO: 36.4 PG (ref 26.5–33)
MCHC RBC AUTO-ENTMCNC: 34.8 G/DL (ref 31.5–36.5)
MCV RBC AUTO: 105 FL (ref 78–100)
PLATELET # BLD AUTO: 295 10E3/UL (ref 150–450)
POTASSIUM BLD-SCNC: 4.8 MMOL/L (ref 3.4–5.3)
RBC # BLD AUTO: 2.58 10E6/UL (ref 3.8–5.2)
SODIUM SERPL-SCNC: 136 MMOL/L (ref 133–144)
WBC # BLD AUTO: 16.8 10E3/UL (ref 4–11)

## 2021-08-28 PROCEDURE — 94640 AIRWAY INHALATION TREATMENT: CPT | Mod: 76

## 2021-08-28 PROCEDURE — 80048 BASIC METABOLIC PNL TOTAL CA: CPT | Performed by: THORACIC SURGERY (CARDIOTHORACIC VASCULAR SURGERY)

## 2021-08-28 PROCEDURE — 71045 X-RAY EXAM CHEST 1 VIEW: CPT | Mod: 26

## 2021-08-28 PROCEDURE — 97530 THERAPEUTIC ACTIVITIES: CPT | Mod: GO | Performed by: OCCUPATIONAL THERAPIST

## 2021-08-28 PROCEDURE — 250N000009 HC RX 250: Performed by: STUDENT IN AN ORGANIZED HEALTH CARE EDUCATION/TRAINING PROGRAM

## 2021-08-28 PROCEDURE — 120N000002 HC R&B MED SURG/OB UMMC

## 2021-08-28 PROCEDURE — 97110 THERAPEUTIC EXERCISES: CPT | Mod: GO | Performed by: OCCUPATIONAL THERAPIST

## 2021-08-28 PROCEDURE — 36415 COLL VENOUS BLD VENIPUNCTURE: CPT | Performed by: THORACIC SURGERY (CARDIOTHORACIC VASCULAR SURGERY)

## 2021-08-28 PROCEDURE — 83735 ASSAY OF MAGNESIUM: CPT | Performed by: THORACIC SURGERY (CARDIOTHORACIC VASCULAR SURGERY)

## 2021-08-28 PROCEDURE — 71045 X-RAY EXAM CHEST 1 VIEW: CPT

## 2021-08-28 PROCEDURE — 250N000013 HC RX MED GY IP 250 OP 250 PS 637: Performed by: STUDENT IN AN ORGANIZED HEALTH CARE EDUCATION/TRAINING PROGRAM

## 2021-08-28 PROCEDURE — 999N000147 HC STATISTIC PT IP EVAL DEFER

## 2021-08-28 PROCEDURE — 258N000003 HC RX IP 258 OP 636: Performed by: STUDENT IN AN ORGANIZED HEALTH CARE EDUCATION/TRAINING PROGRAM

## 2021-08-28 PROCEDURE — 250N000013 HC RX MED GY IP 250 OP 250 PS 637

## 2021-08-28 PROCEDURE — 250N000011 HC RX IP 250 OP 636: Performed by: STUDENT IN AN ORGANIZED HEALTH CARE EDUCATION/TRAINING PROGRAM

## 2021-08-28 PROCEDURE — 94640 AIRWAY INHALATION TREATMENT: CPT

## 2021-08-28 PROCEDURE — 97535 SELF CARE MNGMENT TRAINING: CPT | Mod: GO | Performed by: OCCUPATIONAL THERAPIST

## 2021-08-28 PROCEDURE — 85027 COMPLETE CBC AUTOMATED: CPT | Performed by: THORACIC SURGERY (CARDIOTHORACIC VASCULAR SURGERY)

## 2021-08-28 PROCEDURE — 83605 ASSAY OF LACTIC ACID: CPT | Performed by: STUDENT IN AN ORGANIZED HEALTH CARE EDUCATION/TRAINING PROGRAM

## 2021-08-28 PROCEDURE — 258N000003 HC RX IP 258 OP 636

## 2021-08-28 PROCEDURE — 999N000157 HC STATISTIC RCP TIME EA 10 MIN

## 2021-08-28 PROCEDURE — 36415 COLL VENOUS BLD VENIPUNCTURE: CPT | Performed by: STUDENT IN AN ORGANIZED HEALTH CARE EDUCATION/TRAINING PROGRAM

## 2021-08-28 PROCEDURE — 97166 OT EVAL MOD COMPLEX 45 MIN: CPT | Mod: GO | Performed by: OCCUPATIONAL THERAPIST

## 2021-08-28 RX ORDER — MAGNESIUM SULFATE HEPTAHYDRATE 40 MG/ML
2 INJECTION, SOLUTION INTRAVENOUS ONCE
Status: COMPLETED | OUTPATIENT
Start: 2021-08-28 | End: 2021-08-28

## 2021-08-28 RX ORDER — LIDOCAINE 4 G/G
1 PATCH TOPICAL
Status: DISCONTINUED | OUTPATIENT
Start: 2021-08-28 | End: 2021-09-01 | Stop reason: HOSPADM

## 2021-08-28 RX ADMIN — IPRATROPIUM BROMIDE 0.5 MG: 0.5 SOLUTION RESPIRATORY (INHALATION) at 08:31

## 2021-08-28 RX ADMIN — SODIUM CHLORIDE, POTASSIUM CHLORIDE, SODIUM LACTATE AND CALCIUM CHLORIDE 500 ML: 600; 310; 30; 20 INJECTION, SOLUTION INTRAVENOUS at 01:32

## 2021-08-28 RX ADMIN — ALBUTEROL SULFATE 4 PUFF: 90 AEROSOL, METERED RESPIRATORY (INHALATION) at 19:45

## 2021-08-28 RX ADMIN — DIPHENHYDRAMINE HYDROCHLORIDE 25 MG: 25 CAPSULE ORAL at 19:58

## 2021-08-28 RX ADMIN — ALBUTEROL SULFATE 4 PUFF: 90 AEROSOL, METERED RESPIRATORY (INHALATION) at 12:30

## 2021-08-28 RX ADMIN — OXYCODONE HYDROCHLORIDE 10 MG: 10 TABLET ORAL at 20:47

## 2021-08-28 RX ADMIN — ACETAMINOPHEN 975 MG: 325 TABLET, FILM COATED ORAL at 14:19

## 2021-08-28 RX ADMIN — ENOXAPARIN SODIUM 40 MG: 40 INJECTION SUBCUTANEOUS at 15:35

## 2021-08-28 RX ADMIN — LIDOCAINE 1 PATCH: 560 PATCH PERCUTANEOUS; TOPICAL; TRANSDERMAL at 10:44

## 2021-08-28 RX ADMIN — OXYCODONE HYDROCHLORIDE 5 MG: 5 TABLET ORAL at 10:42

## 2021-08-28 RX ADMIN — GABAPENTIN 100 MG: 100 CAPSULE ORAL at 14:19

## 2021-08-28 RX ADMIN — MAGNESIUM SULFATE IN WATER 2 G: 40 INJECTION, SOLUTION INTRAVENOUS at 09:23

## 2021-08-28 RX ADMIN — DOCUSATE SODIUM 50 MG AND SENNOSIDES 8.6 MG 1 TABLET: 8.6; 5 TABLET, FILM COATED ORAL at 19:45

## 2021-08-28 RX ADMIN — POLYETHYLENE GLYCOL 3350 17 G: 17 POWDER, FOR SOLUTION ORAL at 08:51

## 2021-08-28 RX ADMIN — DOCUSATE SODIUM 50 MG AND SENNOSIDES 8.6 MG 1 TABLET: 8.6; 5 TABLET, FILM COATED ORAL at 08:51

## 2021-08-28 RX ADMIN — TRAZODONE HYDROCHLORIDE 50 MG: 50 TABLET ORAL at 22:27

## 2021-08-28 RX ADMIN — ALBUTEROL SULFATE 4 PUFF: 90 AEROSOL, METERED RESPIRATORY (INHALATION) at 15:35

## 2021-08-28 RX ADMIN — GABAPENTIN 100 MG: 100 CAPSULE ORAL at 08:51

## 2021-08-28 RX ADMIN — HYDROMORPHONE HYDROCHLORIDE 0.4 MG: 0.2 INJECTION, SOLUTION INTRAMUSCULAR; INTRAVENOUS; SUBCUTANEOUS at 12:44

## 2021-08-28 RX ADMIN — SODIUM CHLORIDE, POTASSIUM CHLORIDE, SODIUM LACTATE AND CALCIUM CHLORIDE 500 ML: 600; 310; 30; 20 INJECTION, SOLUTION INTRAVENOUS at 07:09

## 2021-08-28 RX ADMIN — OXYCODONE HYDROCHLORIDE 5 MG: 5 TABLET ORAL at 06:51

## 2021-08-28 RX ADMIN — GABAPENTIN 100 MG: 100 CAPSULE ORAL at 22:27

## 2021-08-28 RX ADMIN — ACETAMINOPHEN 975 MG: 325 TABLET, FILM COATED ORAL at 22:26

## 2021-08-28 RX ADMIN — IPRATROPIUM BROMIDE 0.5 MG: 0.5 SOLUTION RESPIRATORY (INHALATION) at 21:33

## 2021-08-28 RX ADMIN — ACETAMINOPHEN 975 MG: 325 TABLET, FILM COATED ORAL at 06:50

## 2021-08-28 RX ADMIN — ALBUTEROL SULFATE 4 PUFF: 90 AEROSOL, METERED RESPIRATORY (INHALATION) at 08:50

## 2021-08-28 RX ADMIN — OXYCODONE HYDROCHLORIDE 10 MG: 10 TABLET ORAL at 16:19

## 2021-08-28 RX ADMIN — IPRATROPIUM BROMIDE 0.5 MG: 0.5 SOLUTION RESPIRATORY (INHALATION) at 13:29

## 2021-08-28 RX ADMIN — DOCUSATE SODIUM 50 MG AND SENNOSIDES 8.6 MG 1 TABLET: 8.6; 5 TABLET, FILM COATED ORAL at 08:55

## 2021-08-28 ASSESSMENT — ACTIVITIES OF DAILY LIVING (ADL)
ADLS_ACUITY_SCORE: 11
ADLS_ACUITY_SCORE: 13
ADLS_ACUITY_SCORE: 13
PREVIOUS_RESPONSIBILITIES: MEAL PREP;HOUSEKEEPING;LAUNDRY;MEDICATION MANAGEMENT;FINANCES
ADLS_ACUITY_SCORE: 13

## 2021-08-28 NOTE — ANESTHESIA POSTPROCEDURE EVALUATION
"Patient: Yolanda Cheek    Procedure(s):  Uniportal Video-Assisted Thoracoscopic Converted to Open Right Middle Sleeve Lobectomy, Mediastinal Lymph Node Dissection, Flexible Bronchoscopy    Diagnosis:Lung mass [R91.8]  Diagnosis Additional Information: No value filed.    Anesthesia Type:  General    Note:  Disposition: Admission   Postop Pain Control: Uneventful            Sign Out: Well controlled pain   PONV: No   Neuro/Psych: Uneventful            Sign Out: Acceptable/Baseline neuro status   Airway/Respiratory: Uneventful            Sign Out: Acceptable/Baseline resp. status; O2 supplementation               Oxygen: Nasal Cannula   CV/Hemodynamics: Uneventful            Sign Out: Acceptable CV status; No obvious hypovolemia; No obvious fluid overload   Other NRE: NONE   DID A NON-ROUTINE EVENT OCCUR? No    Event details/Postop Comments:  Patient notes \"numbness\" in her right arm.  She states that it begins just distal to her deltoid and runs to her mid fingers.  It does not follow a dermatomal pattern.  On exam she is noted to have full motor function of her right arm, forearm and hand.  Musculocutaneous, median, ulnar and radial nerves are all intact from a motor standpoint with 5/5 strength throughout.  Sensory is grossly intact in these nerve distribution as well however she notes that it feels slight different than her left side.  DTR's are equal in both arms and forearms.  Pulses strong as well.    Given this unusual distribution in addition to the absence of any motor dysfunction I am inclined to watchful waiting.  I have advised her to let her team know should her symptoms not improve within 24 hours and we will reassess her.  I have also discussed that I would suspect that this should improve over that time or shortly thereafter.           Last vitals:  Vitals Value Taken Time   BP 99/69 08/27/21 2210   Temp 36.7  C (98  F) 08/27/21 2145   Pulse 89 08/27/21 2212   Resp 16 08/27/21 2145   SpO2 100 % " 08/27/21 2212   Vitals shown include unvalidated device data.    Electronically Signed By: Vijay Pagan MD  August 27, 2021  10:13 PM

## 2021-08-28 NOTE — PROGRESS NOTES
"Post Op Check     August 28, 2021     Yolanda Cheek is a 56 year old female with h/o substance and alcohol abuse now POD#0 s/p open right middle sleeve lobectomy with mediastinal lymph node dissection,      Upon entering the room, patient is laying in bed attempting to urinate in bedpan. She reports that her pain is well controlled and she has been able to skip doses on her PCA. She reports that her pain is located around her chest tube site. She has been drinking water without issues. She denies nausea, vomiting, SOB, or dizziness. No BM. She required straight cath earlier in the evening and is currently due to void.     BP (!) 83/52 (BP Location: Left arm)   Pulse 81   Temp 98.7  F (37.1  C) (Oral)   Resp 12   Ht 1.702 m (5' 7\")   Wt 66 kg (145 lb 8.1 oz)   SpO2 99%   BMI 22.79 kg/m       Gen: alert, awake, NAD, pleasant and mentating well  CV: regular rate and rhythm, low BP 83/52, radial pulses intact  Resp: normal respiratory effort, nasal cannula in place  Chest: right sided chest tube in place, no strike through on bandages, to water seal with no air leak, minimal (~10cc) serosang output. Focal tenderness to palpation around chest tube site, otherwise nontender.  Abdomen: soft, non-tender, non-distended  Extremities: warm and well perfused     A/P: Blood pressures persistently low, 80s/50s post operatively. Patient was on IV phenylephrine in PACU, discontinued when she got to the floor. 500cc LR bolus was given with minimal response, pressures temporarily 90s/60s. Vitals dissussed with thoracic fellow. Given her very well appearance, normal mentation and otherwise reassuring vital signs, will continue to monitor closely. No acute interventions needed at this time.    Continue plan of care per primary team. Please call with any questions.     Albina Franz MD  Surgery Resident  #[id number]             "

## 2021-08-28 NOTE — ANESTHESIA CARE TRANSFER NOTE
Patient: Yolanda Cheek    Procedure(s):  Uniportal Video-Assisted Thoracoscopic Converted to Open Right Middle Sleeve Lobectomy, Mediastinal Lymph Node Dissection, Flexible Bronchoscopy    Diagnosis: Lung mass [R91.8]  Diagnosis Additional Information: No value filed.    Anesthesia Type:   No value filed.     Note:    Oropharynx: spontaneously breathing  Level of Consciousness: awake and drowsy  Oxygen Supplementation: face mask  Level of Supplemental Oxygen (L/min / FiO2): 6      Vital Signs Stable: post-procedure vital signs reviewed and stable  Report to RN Given: handoff report given  Patient transferred to: PACU    Handoff Report: Identifed the Patient, Identified the Reponsible Provider, Reviewed the pertinent medical history, Discussed the surgical course, Reviewed Intra-OP anesthesia mangement and issues during anesthesia, Set expectations for post-procedure period and Allowed opportunity for questions and acknowledgement of understanding      Vitals:  Vitals Value Taken Time   /78 08/27/21 2134   Temp     Pulse 96 08/27/21 2141   Resp     SpO2 100 % 08/27/21 2141   Vitals shown include unvalidated device data.    Electronically Signed By: Bradley Jones DO  August 27, 2021  9:42 PM

## 2021-08-28 NOTE — PLAN OF CARE
Admitting Diagnosis: Lung Mass  Status: POD1 s/p R middle lobectomy, lymph node dissection, flexible bronchoscopy   Neuro: A&Ox4, anxious but cooperative  GI/: Voided 150 @ 0530, LBM 21  Resp: Breath sounds clear and equal. IS attempted with success.   VS: Hypotensive despite LR Bolus of 500, all other VSS  Incisions/LDA: R chest incision, R CT to water seal, minimal dark red output  Skin: R chest incision CDI  IV Access: R PIV SL, L PIV running PCA  Labs: Ma.5, team notified   Pain: Pain managed with PCA  Diet: Clears  Activity: Up with assist of 1 to bathroom  This shift: Pt arrived to floor at 2330. VS's remained stable except BP which remained in 80's over 50's. Team notified, bolus given. BP remained low, team advised close monitoring and to notify if pt becomes symptomatic. Ordered additional 500 LR bolus for AM.  Plan:  Continue to monitor, encourage IS, ambulation and frequent attempts to void.     Admitted/transferred from: PACU  2 RN full   skin assessment completed by Sara Jiménez RN and Aileen GOODWIN RN.  Skin assessment finding: issues found  R chest incision, CDI, no drainage.    Interventions/actions: skin interventions Elevated heels, preventative mepilex, provided education on avoidance of skin breakdown.      Will continue to monitor.

## 2021-08-28 NOTE — PROGRESS NOTES
Vitals: Temp: 97.8  F (36.6  C) Temp src: Oral BP: 91/60 Pulse: 90   Resp: 18 SpO2: 98 % O2 Device: Nasal cannula Oxygen Delivery: 1.5 LPM     Time: 6860-8597  Reason for admission: POD#1 s/p (R) middle lobectomy, lump node dissection, flexible bronchoscopy. Lung mass.   Activity:  Ax1 for ambulation to the toilet. Ambulated in the hallways with therapy. Uses incentive spirometer independently.   Pain:  Pt reports pain to the exit site of the CT on the (R). Pain managed per MAR.  Lidocaine patche in place below the CT exit site.  Neuro: A&O x4. Able to make needs known. Uses call light appropriately. Speech clear.   Cardiac: WDL ext for soft BP.  Respiratory:  Pt is on 1.5L via NC. Chest expansion symmetrical and non labored.   GI/:  Abd soft and non tender. Patient denies N/V. Voids spontaneously with adequate output.   Diet: Advanced to regular diet per orders.   Lines: Patient reported pain to the (L) PIV. Removed (L) PIV per patient request.   Incisions/Drains/Skin:    Lab:  . Lactic acid 1.0.   Electrolyte Replacements: Mag replaced.  New changes this shift: Diet advanced to regular.

## 2021-08-28 NOTE — PROGRESS NOTES
"THORACIC SURGERY PROGRESS NOTE     S: Patient had systolic blood pressures in the 80s throughout the evening. Was asymptomatic Received a one time fluid bolus of 500 ml. Low urine output of 0.29 ml/kg/hr overnight. Mg low this AM, replacement ordered.      O:  BP (!) 81/55 (BP Location: Left arm)   Pulse 81   Temp 97.8  F (36.6  C) (Oral)   Resp 12   Ht 1.702 m (5' 7\")   Wt 66 kg (145 lb 8.1 oz)   SpO2 96%   BMI 22.79 kg/m       Gen: NAD, AAO  Resp: non labored breathing on 2LNC  chest tube with air leak to water seal  CV: RRR    Chest tube 27 ml out overnight   ml yest, 150      A/P: 55 yo F with 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      - Continue chest tube to water seal  - AM CXR  - AM labs pending  - LR bolus x1   - Mg 2mg IV x1  - Dilaudid PCA, tylenol julio césar, gabapentin julio césar, oxy prn   - Lovenox  - Albuterol and ipratropium julio césar  - Possible discharge tomorrow pending pain control and chest tube air leak     Seen with thoracic fellow, will discuss with staff.    Anayeli Monson PGY-3  General Surgery Resident  Thoracic Surgery      "

## 2021-08-28 NOTE — PROGRESS NOTES
Paged [phone]: Yolanda Cheek Counts include 234 beds at the [facility] 23. cheset xray done please call back to verify thanks Ingrid *[phone]    Dr. Monson said chest xray looks ok and labs also look ok to transfer to the floor. Ignrid Lazcano RN on 8/27/2021 at 10:19 PM

## 2021-08-28 NOTE — PROGRESS NOTES
"   08/28/21 1007   Quick Adds   Type of Visit Initial Occupational Therapy Evaluation   Living Environment   People in home alone   Current Living Arrangements apartment   Home Accessibility stairs to enter home   Number of Stairs, Main Entrance greater than 10 stairs  (27)   Living Environment Comments Pt plans to stay with sister in cabin, with 3 RAFAL   Self-Care   Usual Activity Tolerance good   Current Activity Tolerance moderate   Equipment Currently Used at Home none   Activity/Exercise/Self-Care Comment Indep with ADLs/mobility at baseline   Instrumental Activities of Daily Living (IADL)   Previous Responsibilities meal prep;housekeeping;laundry;medication management;finances   IADL Comments DIL assists with groceries   Disability/Function   Walking or Climbing Stairs Difficulty no   Dressing/Bathing Difficulty no   Toileting issues no   Doing Errands Independently Difficulty (such as shopping) no   Fall history within last six months no   Change in Functional Status Since Onset of Current Illness/Injury yes   General Information   Onset of Illness/Injury or Date of Surgery 08/27/21   Referring Physician Jose D Lindo   Patient/Family Therapy Goal Statement (OT) \"I'm going to get better\"   Additional Occupational Profile Info/Pertinent History of Current Problem 57 yo F with 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   Existing Precautions/Restrictions thoracotomy   Left Upper Extremity (Weight-bearing Status) partial weight-bearing (PWB)  (10#)   Right Upper Extremity (Weight-bearing Status) partial weight-bearing (PWB)  (10#)   Left Lower Extremity (Weight-bearing Status) full weight-bearing (FWB)   General Observations and Info Activity order: ambulate with assist 3x daily   Cognitive Status Examination   Orientation Status orientation to person, place and time "   Affect/Mental Status (Cognitive) WNL   Follows Commands follows one-step commands;over 90% accuracy   Cognitive Status Comments no cognitive concerns   Pain Assessment   Patient Currently in Pain Yes, see Vital Sign flowsheet   Integumentary/Edema   Integumentary/Edema Comments chest tube   Posture   Posture protracted shoulders   Range of Motion Comprehensive   Comment, General Range of Motion LUE WFL; RUE generally WFL, limited by surgical pain   Strength Comprehensive (MMT)   Comment, General Manual Muscle Testing (MMT) Assessment pt demonstrated functional BUE/BLE strength during transfers/bed mobility   Coordination   Upper Extremity Coordination No deficits were identified   Bed Mobility   Bed Mobility supine-sit;sit-supine;rolling right   Rolling Right [place] (Bed Mobility) verbal cues;supervision   Supine-Sit [place] (Bed Mobility) contact guard;verbal cues;supervision;set up   Sit-Supine [place] (Bed Mobility) contact guard;verbal cues;supervision;set up   Assistive Device (Bed Mobility) bed rails   Transfers   Transfers sit-stand transfer;toilet transfer;shower transfer   Sit-Stand Transfer   Sit-Stand [place] (Transfers) contact guard;verbal cues;supervision;set up   Shower Transfer   Shower Transfer Comments walk-in shower available at cabin   Toilet Transfer   Type (Toilet Transfer) sit-stand;stand-sit   [place] Level (Toilet Transfer) contact guard;verbal cues;supervision   Balance   Balance Comments good seated; good standing and ambulating in room holding IV pole   Activities of Daily Living   BADL Assessment lower body dressing;toileting   Lower Body Dressing Assessment   [place] Level (Lower Body Dressing) contact guard assist;verbal cues;supervision;set up   Position (Lower Body Dressing) edge of bed sitting   Toileting   [place] Level (Toileting) contact guard assist;supervision   Comment (Toileting) pt has standard height toilet   Clinical Impression    Criteria for Skilled Therapeutic Interventions Met (OT) yes;meets criteria;skilled treatment is necessary   OT Diagnosis impaired ADLs   OT Problem List-Impairments impacting ADL problems related to;activity tolerance impaired;balance;range of motion (ROM);pain;post-surgical precautions   Assessment of Occupational Performance 3-5 Performance Deficits   Identified Performance Deficits dressing, bathing, toileting, home chores   Planned Therapy Interventions (OT) ADL retraining;bed mobility training;ROM;strengthening;stretching;transfer training;home program guidelines;progressive activity/exercise   Clinical Decision Making Complexity (OT) moderate complexity   Therapy Frequency (OT) Daily   Predicted Duration of Therapy 3 days   Risk & Benefits of therapy have been explained evaluation/treatment results reviewed;care plan/treatment goals reviewed;risks/benefits reviewed;participants included;patient;sibling   OT Discharge Planning    OT Discharge Recommendation (DC Rec) Home with assist;home with home care occupational therapy   OT Rationale for DC Rec Pt progressing well, anticipate she will be able to discharge to home with assist for heavy I/ADLs once medically stable. Pt may benefit from Home OT, pending progress.   OT Brief overview of current status  CGA bed mobility, CGA ambulation in room holding IV pole, CGA toilet transfer, SBA LE dressing, dizzy with ambulation out of room   Total Evaluation Time (Minutes)   Total Evaluation Time (Minutes) 5

## 2021-08-28 NOTE — PLAN OF CARE
7B PT: cancel/defer: Orders received and appreciated. Per conversation with OT and chart review. Pt mobilizing well with SBA, limited secondary to dizziness/hypotension. No further acute PT needs at this time, OT will follow and address all IP needs. PT to sign off and complete order. Thank you.

## 2021-08-28 NOTE — BRIEF OP NOTE
[facility]    Brief Operative Note    Pre-operative diagnosis: Lung mass [R91.8]  Post-operative diagnosis Same    Procedure: Procedure(s):  Uniportal Video-Assisted Thoracoscopic Converted to Open Right Middle Sleeve Lobectomy, Mediastinal Lymph Node Dissection, Flexible Bronchoscopy  Surgeon: Surgeon(s) and Role:     * Eliezer Knox MD - Primary     * Anayeli Monson MD - Resident - Assisting      * Jose D Lindo MD - Fellow - Assisting  Anesthesia: General   Estimated blood loss: 500 mls  Drains:24 Fr chest tube  Specimens:   ID Type Source Tests Collected by Time Destination   1 : Lymph Node Level 7 Tissue Lymph Node(s) SURGICAL PATHOLOGY EXAM Eliezer Knox MD 8/27/2021  4:19 PM    2 : Lymph Node Level 7 Tissue Lymph Node(s) SURGICAL PATHOLOGY EXAM Eliezer Knox MD 8/27/2021  4:38 PM    3 : Lymph Node 4R Tissue Lymph Node(s) SURGICAL PATHOLOGY EXAM Eliezer Knox MD 8/27/2021  4:48 PM    4 : Lymph Node 4R Tissue Lymph Node(s) SURGICAL PATHOLOGY EXAM Eliezer Knox MD 8/27/2021  4:56 PM    5 : Lymph Node 2R Tissue Lymph Node(s) SURGICAL PATHOLOGY EXAM Eliezer Knox MD 8/27/2021  5:02 PM    6 : Phrenic Nerve Margin #1 Tissue Other SURGICAL PATHOLOGY EXAM Eliezer Knox MD 8/27/2021  5:27 PM    7 : Hilar Fat  Tissue Other SURGICAL PATHOLOGY EXAM Eliezer Knox MD 8/27/2021  5:29 PM    8 : Lymph Node 10R Tissue Lymph Node(s) SURGICAL PATHOLOGY EXAM Eliezer Knox MD 8/27/2021  5:34 PM    9 : Lymph [id number] Tissue Lymph Node(s) SURGICAL PATHOLOGY EXAM Eliezer Knox MD 8/27/2021  5:37 PM    10 : Right Middle Lobe, Stitch on Bronchial margin Tissue Lung, Middle Lobe, Right SURGICAL PATHOLOGY EXAM Eliezer Knox MD 8/27/2021  7:23 PM    11 : Final Bronchial Margin Tissue Lung, Right SURGICAL PATHOLOGY EXAM Eliezer Knox MD  8/27/2021  7:27 PM    A : ABG Blood, arterial Line, arterial BLOOD GAS ARTERIAL Eliezer Knox MD 8/27/2021  7:44 PM      Findings: Negative bronchial margins    Complications: None  Implants: None

## 2021-08-28 NOTE — PROVIDER NOTIFICATION
"Provider notified: \"Pt's BP 88/63. Also, do you want Phenylephrine IV to be discontinued? It was stopped in the PACU\".     Sara ALVARADO     Provider responded: Give 500 ml bolus of LR, continue to monitor BP.   "

## 2021-08-28 NOTE — PROVIDER NOTIFICATION
Overnight cross cover notified:   Pt's Mag is 1.5. Pt also remains hypotensive but asymptomatic.     Sara ALVARADO

## 2021-08-29 ENCOUNTER — APPOINTMENT (OUTPATIENT)
Dept: GENERAL RADIOLOGY | Facility: CLINIC | Age: 56
End: 2021-08-29
Attending: THORACIC SURGERY (CARDIOTHORACIC VASCULAR SURGERY)
Payer: COMMERCIAL

## 2021-08-29 LAB
ANION GAP SERPL CALCULATED.3IONS-SCNC: 5 MMOL/L (ref 3–14)
BUN SERPL-MCNC: 6 MG/DL (ref 7–30)
CALCIUM SERPL-MCNC: 7.8 MG/DL (ref 8.5–10.1)
CHLORIDE BLD-SCNC: 102 MMOL/L (ref 94–109)
CO2 SERPL-SCNC: 29 MMOL/L (ref 20–32)
CREAT SERPL-MCNC: 0.49 MG/DL (ref 0.52–1.04)
ERYTHROCYTE [DISTWIDTH] IN BLOOD BY AUTOMATED COUNT: 15.3 % (ref 10–15)
GFR SERPL CREATININE-BSD FRML MDRD: >90 ML/MIN/1.73M2
GLUCOSE BLD-MCNC: 125 MG/DL (ref 70–99)
HCT VFR BLD AUTO: 21.8 % (ref 35–47)
HGB BLD-MCNC: 7.3 G/DL (ref 11.7–15.7)
LACTATE SERPL-SCNC: 2 MMOL/L (ref 0.7–2)
MAGNESIUM SERPL-MCNC: 1.7 MG/DL (ref 1.6–2.3)
MCH RBC QN AUTO: 36 PG (ref 26.5–33)
MCHC RBC AUTO-ENTMCNC: 33.5 G/DL (ref 31.5–36.5)
MCV RBC AUTO: 107 FL (ref 78–100)
PHOSPHATE SERPL-MCNC: 2.5 MG/DL (ref 2.5–4.5)
PLATELET # BLD AUTO: 224 10E3/UL (ref 150–450)
POTASSIUM BLD-SCNC: 3.9 MMOL/L (ref 3.4–5.3)
RBC # BLD AUTO: 2.03 10E6/UL (ref 3.8–5.2)
SODIUM SERPL-SCNC: 136 MMOL/L (ref 133–144)
WBC # BLD AUTO: 8.8 10E3/UL (ref 4–11)

## 2021-08-29 PROCEDURE — 85027 COMPLETE CBC AUTOMATED: CPT

## 2021-08-29 PROCEDURE — 71045 X-RAY EXAM CHEST 1 VIEW: CPT

## 2021-08-29 PROCEDURE — 80048 BASIC METABOLIC PNL TOTAL CA: CPT

## 2021-08-29 PROCEDURE — 250N000013 HC RX MED GY IP 250 OP 250 PS 637: Performed by: STUDENT IN AN ORGANIZED HEALTH CARE EDUCATION/TRAINING PROGRAM

## 2021-08-29 PROCEDURE — 120N000002 HC R&B MED SURG/OB UMMC

## 2021-08-29 PROCEDURE — 71045 X-RAY EXAM CHEST 1 VIEW: CPT | Mod: 26 | Performed by: RADIOLOGY

## 2021-08-29 PROCEDURE — 250N000013 HC RX MED GY IP 250 OP 250 PS 637

## 2021-08-29 PROCEDURE — 94640 AIRWAY INHALATION TREATMENT: CPT | Mod: 76

## 2021-08-29 PROCEDURE — 94640 AIRWAY INHALATION TREATMENT: CPT

## 2021-08-29 PROCEDURE — 250N000011 HC RX IP 250 OP 636

## 2021-08-29 PROCEDURE — 36415 COLL VENOUS BLD VENIPUNCTURE: CPT | Performed by: THORACIC SURGERY (CARDIOTHORACIC VASCULAR SURGERY)

## 2021-08-29 PROCEDURE — 83605 ASSAY OF LACTIC ACID: CPT | Performed by: THORACIC SURGERY (CARDIOTHORACIC VASCULAR SURGERY)

## 2021-08-29 PROCEDURE — 84100 ASSAY OF PHOSPHORUS: CPT

## 2021-08-29 PROCEDURE — 250N000009 HC RX 250: Performed by: STUDENT IN AN ORGANIZED HEALTH CARE EDUCATION/TRAINING PROGRAM

## 2021-08-29 PROCEDURE — 999N000157 HC STATISTIC RCP TIME EA 10 MIN

## 2021-08-29 PROCEDURE — 250N000011 HC RX IP 250 OP 636: Performed by: STUDENT IN AN ORGANIZED HEALTH CARE EDUCATION/TRAINING PROGRAM

## 2021-08-29 PROCEDURE — 36415 COLL VENOUS BLD VENIPUNCTURE: CPT

## 2021-08-29 PROCEDURE — 83735 ASSAY OF MAGNESIUM: CPT

## 2021-08-29 RX ORDER — KETOROLAC TROMETHAMINE 15 MG/ML
15 INJECTION, SOLUTION INTRAMUSCULAR; INTRAVENOUS EVERY 6 HOURS
Status: DISCONTINUED | OUTPATIENT
Start: 2021-08-29 | End: 2021-08-29

## 2021-08-29 RX ORDER — OXYCODONE HYDROCHLORIDE 10 MG/1
10 TABLET ORAL EVERY 6 HOURS
Status: DISCONTINUED | OUTPATIENT
Start: 2021-08-29 | End: 2021-08-30

## 2021-08-29 RX ORDER — ACETAMINOPHEN 325 MG/1
975 TABLET ORAL EVERY 6 HOURS
Status: DISCONTINUED | OUTPATIENT
Start: 2021-08-29 | End: 2021-09-01 | Stop reason: HOSPADM

## 2021-08-29 RX ORDER — HYDROMORPHONE HCL IN WATER/PF 6 MG/30 ML
0.2 PATIENT CONTROLLED ANALGESIA SYRINGE INTRAVENOUS EVERY 4 HOURS PRN
Status: DISCONTINUED | OUTPATIENT
Start: 2021-08-29 | End: 2021-09-01

## 2021-08-29 RX ORDER — FUROSEMIDE 20 MG
20 TABLET ORAL ONCE
Status: COMPLETED | OUTPATIENT
Start: 2021-08-29 | End: 2021-08-29

## 2021-08-29 RX ADMIN — OXYCODONE HYDROCHLORIDE 10 MG: 10 TABLET ORAL at 11:21

## 2021-08-29 RX ADMIN — KETOROLAC TROMETHAMINE 15 MG: 15 INJECTION, SOLUTION INTRAMUSCULAR; INTRAVENOUS at 08:03

## 2021-08-29 RX ADMIN — ACETAMINOPHEN 975 MG: 325 TABLET, FILM COATED ORAL at 07:52

## 2021-08-29 RX ADMIN — POLYETHYLENE GLYCOL 3350 17 G: 17 POWDER, FOR SOLUTION ORAL at 07:53

## 2021-08-29 RX ADMIN — ALBUTEROL SULFATE 4 PUFF: 90 AEROSOL, METERED RESPIRATORY (INHALATION) at 16:42

## 2021-08-29 RX ADMIN — OXYCODONE HYDROCHLORIDE 10 MG: 10 TABLET ORAL at 22:25

## 2021-08-29 RX ADMIN — HYDROMORPHONE HYDROCHLORIDE 0.2 MG: 0.2 INJECTION, SOLUTION INTRAMUSCULAR; INTRAVENOUS; SUBCUTANEOUS at 15:53

## 2021-08-29 RX ADMIN — ENOXAPARIN SODIUM 40 MG: 40 INJECTION SUBCUTANEOUS at 15:57

## 2021-08-29 RX ADMIN — ALBUTEROL SULFATE 4 PUFF: 90 AEROSOL, METERED RESPIRATORY (INHALATION) at 20:45

## 2021-08-29 RX ADMIN — IPRATROPIUM BROMIDE 0.5 MG: 0.5 SOLUTION RESPIRATORY (INHALATION) at 13:31

## 2021-08-29 RX ADMIN — FUROSEMIDE 20 MG: 20 TABLET ORAL at 13:02

## 2021-08-29 RX ADMIN — HYDROMORPHONE HYDROCHLORIDE 0.2 MG: 0.2 INJECTION, SOLUTION INTRAMUSCULAR; INTRAVENOUS; SUBCUTANEOUS at 23:48

## 2021-08-29 RX ADMIN — IPRATROPIUM BROMIDE 0.5 MG: 0.5 SOLUTION RESPIRATORY (INHALATION) at 20:30

## 2021-08-29 RX ADMIN — OXYCODONE HYDROCHLORIDE 10 MG: 10 TABLET ORAL at 16:42

## 2021-08-29 RX ADMIN — LIDOCAINE 1 PATCH: 560 PATCH PERCUTANEOUS; TOPICAL; TRANSDERMAL at 07:53

## 2021-08-29 RX ADMIN — ACETAMINOPHEN 975 MG: 325 TABLET, FILM COATED ORAL at 20:45

## 2021-08-29 RX ADMIN — ALBUTEROL SULFATE 4 PUFF: 90 AEROSOL, METERED RESPIRATORY (INHALATION) at 07:53

## 2021-08-29 RX ADMIN — GABAPENTIN 100 MG: 100 CAPSULE ORAL at 14:15

## 2021-08-29 RX ADMIN — TRAZODONE HYDROCHLORIDE 50 MG: 50 TABLET ORAL at 22:25

## 2021-08-29 RX ADMIN — IPRATROPIUM BROMIDE 0.5 MG: 0.5 SOLUTION RESPIRATORY (INHALATION) at 08:16

## 2021-08-29 RX ADMIN — DOCUSATE SODIUM 50 MG AND SENNOSIDES 8.6 MG 2 TABLET: 8.6; 5 TABLET, FILM COATED ORAL at 07:52

## 2021-08-29 RX ADMIN — GABAPENTIN 100 MG: 100 CAPSULE ORAL at 07:52

## 2021-08-29 RX ADMIN — GABAPENTIN 100 MG: 100 CAPSULE ORAL at 22:25

## 2021-08-29 RX ADMIN — DIPHENHYDRAMINE HYDROCHLORIDE 25 MG: 25 CAPSULE ORAL at 02:05

## 2021-08-29 RX ADMIN — ALBUTEROL SULFATE 4 PUFF: 90 AEROSOL, METERED RESPIRATORY (INHALATION) at 11:21

## 2021-08-29 RX ADMIN — ACETAMINOPHEN 975 MG: 325 TABLET, FILM COATED ORAL at 14:15

## 2021-08-29 ASSESSMENT — ACTIVITIES OF DAILY LIVING (ADL)
ADLS_ACUITY_SCORE: 13

## 2021-08-29 ASSESSMENT — MIFFLIN-ST. JEOR: SCORE: 1300.32

## 2021-08-29 NOTE — PROGRESS NOTES
STAFF ADDENDUM:  I saw and evaluated Ms. Cheek and agree with the resident s findings and plan of care as documented in the resident s note and edited by me, as applicable.    Pain issues  Otherwise stable  Tube clamped: remove later today/tmrw  Pain control  Plan for discontinue today    Mary Dietz MD

## 2021-08-29 NOTE — PROGRESS NOTES
Vitals: Temp: 98.3  F (36.8  C) Temp src: Oral BP: 100/50 Pulse: 101   Resp: 16 SpO2: 91 % O2 Device: None (Room air)     Time: 4723-4095  Reason for admission: POD#2 s/p (R) middle lobectomy, lump node dissection, flexible bronchoscopy. Lung mass.   Activity:  Ax1 for ambulation to the toilet. Uses incentive spirometer independently goal set to 1500..   Pain:  Pt reports pain to the exit site of the CT on the (R). Pain managed per MAR.  Neuro: A&O x4. Able to make needs known. Uses call light appropriately. Speech clear.   Cardiac: WDL ext for soft BP.  Respiratory:  Chest expansion symmetrical and non labored. Chest tube clammed by MDs.   GI/:  Abd soft and non tender. Patient denies N/V. Voids spontaneously with adequate output.   Diet: regular diet, tolerating well.    Lines:  Removed (L) PIV per patient request.   Incisions/Drains/Skin:  Skin WDL ext for surgical incisions and lap sites.   Lab:  NA  Electrolyte Replacements: Mag replaced.  New changes this shift: No acute changes this shift. PCA pump discontinued.

## 2021-08-29 NOTE — PLAN OF CARE
"/65   Pulse 103   Temp 97.8  F (36.6  C) (Oral)   Resp 28   Ht 1.702 m (5' 7\")   Wt 66 kg (145 lb 8.1 oz)   SpO2 94%   BMI 22.79 kg/m     Neuro: A/Ox3  VS: VSS on 2L  CAPNO: WNL  Pain: c/o incisional pain, on Hydromorphone PCA, PRN oxycodone  GI: on regular diet and tolerating good. Denies nausea. No BM  : Voiding orange color urine  Skin/Drain: LL CT intact on water seal, Total of 300ml serosanguineous drainage, dressing changed  PIV infusing TKO + PCA  Activity - SBA with GB pushing on IV pole, walked in noe x11  Education - Pain management  Plan of Care - Continue with POC.    "

## 2021-08-29 NOTE — PROGRESS NOTES
"THORACIC SURGERY PROGRESS NOTE     S: No overnight events. Having some breakthrough pain PCA. Small intermittent airleak with cough noted this AM     O:  BP 96/51 (BP Location: Right arm)   Pulse 99   Temp 97.5  F (36.4  C) (Oral)   Resp 18   Ht 1.702 m (5' 7\")   Wt 66 kg (145 lb 8.1 oz)   SpO2 98%   BMI 22.79 kg/m       Gen: NAD, AAO  Resp: non labored breathing on 2LNC  chest tube with intermittent air leak to water seal  CV: RRR    Chest tube 600 ml out yesterday, 390 overnight  UOP 1075 ml yest, 800 overnight     A/P: 57 yo F with 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      - CT clamp trial this AM, will check CXR at 12.    - Keep Chest tube clamped overnight  - Hgb 7.3 this AM, down from 9.4. currently asymptomatic and no concern for current bleeding. Likely dilutional component given LR bolus yesterday.   - Up 2lbs from admission. Ordered diuresis with PO lasix 20mg   - Recheck AM labs   - Discontinue PCA. Continue pain control with scheduled tylenol, gabapentin, lidocaine patch, oxycodone  - Wean O2  - Lovenox  - Albuterol and ipratropium julio césar  - Possible discharge tomorrow pending pain control and chest tube air leak     Seen with thoracic fellow, will discuss with staff.    - - - - - - - - - - - - - - - - - -  Kevin Wick MD  [facility] General Surgery PGY-1  08/29/2021    See [facility] for on-call pager information.        "

## 2021-08-29 NOTE — PLAN OF CARE
"BP 96/51 (BP Location: Right arm)   Pulse 99   Temp 97.5  F (36.4  C) (Oral)   Resp 18   Ht 1.702 m (5' 7\")   Wt 66 kg (145 lb 8.1 oz)   SpO2 98%   BMI 22.79 kg/m      VSS, chest tube to waterseal with over 350 ml of serosanguinous out in 12 hours, MD's clamped chest tube this morning, chest tube dressing changed x 1 for small to moderate leakage at chest tube insertion site,  2 L oxygen overnight, SBA to bathroom, has a lot of pain and anxiety after getting up and walking, tried some visual imagery with some success to help calm her, voiding adequate amounts, tolerating diet, no BM, PCA dilaudid discontinued this morning, continue with plan of care  "

## 2021-08-30 ENCOUNTER — APPOINTMENT (OUTPATIENT)
Dept: GENERAL RADIOLOGY | Facility: CLINIC | Age: 56
End: 2021-08-30
Attending: THORACIC SURGERY (CARDIOTHORACIC VASCULAR SURGERY)
Payer: COMMERCIAL

## 2021-08-30 ENCOUNTER — APPOINTMENT (OUTPATIENT)
Dept: OCCUPATIONAL THERAPY | Facility: CLINIC | Age: 56
End: 2021-08-30
Attending: THORACIC SURGERY (CARDIOTHORACIC VASCULAR SURGERY)
Payer: COMMERCIAL

## 2021-08-30 ENCOUNTER — APPOINTMENT (OUTPATIENT)
Dept: GENERAL RADIOLOGY | Facility: CLINIC | Age: 56
End: 2021-08-30
Attending: PHYSICIAN ASSISTANT
Payer: COMMERCIAL

## 2021-08-30 LAB
ANION GAP SERPL CALCULATED.3IONS-SCNC: 3 MMOL/L (ref 3–14)
BLD PROD TYP BPU: NORMAL
BLD PROD TYP BPU: NORMAL
BLOOD COMPONENT TYPE: NORMAL
BLOOD COMPONENT TYPE: NORMAL
BUN SERPL-MCNC: 5 MG/DL (ref 7–30)
CALCIUM SERPL-MCNC: 8.3 MG/DL (ref 8.5–10.1)
CHLORIDE BLD-SCNC: 104 MMOL/L (ref 94–109)
CO2 SERPL-SCNC: 29 MMOL/L (ref 20–32)
CODING SYSTEM: NORMAL
CODING SYSTEM: NORMAL
CREAT SERPL-MCNC: 0.42 MG/DL (ref 0.52–1.04)
CROSSMATCH: NORMAL
CROSSMATCH: NORMAL
ERYTHROCYTE [DISTWIDTH] IN BLOOD BY AUTOMATED COUNT: 15.5 % (ref 10–15)
GFR SERPL CREATININE-BSD FRML MDRD: >90 ML/MIN/1.73M2
GLUCOSE BLD-MCNC: 112 MG/DL (ref 70–99)
HCT VFR BLD AUTO: 20.1 % (ref 35–47)
HGB BLD-MCNC: 7 G/DL (ref 11.7–15.7)
MAGNESIUM SERPL-MCNC: 1.6 MG/DL (ref 1.6–2.3)
MCH RBC QN AUTO: 36.8 PG (ref 26.5–33)
MCHC RBC AUTO-ENTMCNC: 34.8 G/DL (ref 31.5–36.5)
MCV RBC AUTO: 106 FL (ref 78–100)
PHOSPHATE SERPL-MCNC: 3.3 MG/DL (ref 2.5–4.5)
PLATELET # BLD AUTO: 229 10E3/UL (ref 150–450)
POTASSIUM BLD-SCNC: 3.6 MMOL/L (ref 3.4–5.3)
RBC # BLD AUTO: 1.9 10E6/UL (ref 3.8–5.2)
SODIUM SERPL-SCNC: 136 MMOL/L (ref 133–144)
UNIT ABO/RH: NORMAL
UNIT ABO/RH: NORMAL
UNIT NUMBER: NORMAL
UNIT NUMBER: NORMAL
UNIT STATUS: NORMAL
UNIT STATUS: NORMAL
UNIT TYPE ISBT: 5100
UNIT TYPE ISBT: 5100
WBC # BLD AUTO: 7.1 10E3/UL (ref 4–11)

## 2021-08-30 PROCEDURE — 250N000013 HC RX MED GY IP 250 OP 250 PS 637

## 2021-08-30 PROCEDURE — 86923 COMPATIBILITY TEST ELECTRIC: CPT | Performed by: THORACIC SURGERY (CARDIOTHORACIC VASCULAR SURGERY)

## 2021-08-30 PROCEDURE — 71045 X-RAY EXAM CHEST 1 VIEW: CPT | Mod: 26 | Performed by: STUDENT IN AN ORGANIZED HEALTH CARE EDUCATION/TRAINING PROGRAM

## 2021-08-30 PROCEDURE — 999N000157 HC STATISTIC RCP TIME EA 10 MIN

## 2021-08-30 PROCEDURE — 250N000013 HC RX MED GY IP 250 OP 250 PS 637: Performed by: STUDENT IN AN ORGANIZED HEALTH CARE EDUCATION/TRAINING PROGRAM

## 2021-08-30 PROCEDURE — 36415 COLL VENOUS BLD VENIPUNCTURE: CPT

## 2021-08-30 PROCEDURE — 97530 THERAPEUTIC ACTIVITIES: CPT | Mod: GO | Performed by: OCCUPATIONAL THERAPIST

## 2021-08-30 PROCEDURE — 94640 AIRWAY INHALATION TREATMENT: CPT

## 2021-08-30 PROCEDURE — 86923 COMPATIBILITY TEST ELECTRIC: CPT | Performed by: PHYSICIAN ASSISTANT

## 2021-08-30 PROCEDURE — 120N000002 HC R&B MED SURG/OB UMMC

## 2021-08-30 PROCEDURE — 94640 AIRWAY INHALATION TREATMENT: CPT | Mod: 76

## 2021-08-30 PROCEDURE — 80048 BASIC METABOLIC PNL TOTAL CA: CPT

## 2021-08-30 PROCEDURE — 250N000009 HC RX 250: Performed by: STUDENT IN AN ORGANIZED HEALTH CARE EDUCATION/TRAINING PROGRAM

## 2021-08-30 PROCEDURE — P9016 RBC LEUKOCYTES REDUCED: HCPCS | Performed by: THORACIC SURGERY (CARDIOTHORACIC VASCULAR SURGERY)

## 2021-08-30 PROCEDURE — 97535 SELF CARE MNGMENT TRAINING: CPT | Mod: GO | Performed by: OCCUPATIONAL THERAPIST

## 2021-08-30 PROCEDURE — 250N000011 HC RX IP 250 OP 636

## 2021-08-30 PROCEDURE — 250N000011 HC RX IP 250 OP 636: Performed by: STUDENT IN AN ORGANIZED HEALTH CARE EDUCATION/TRAINING PROGRAM

## 2021-08-30 PROCEDURE — 71045 X-RAY EXAM CHEST 1 VIEW: CPT | Mod: 77

## 2021-08-30 PROCEDURE — 258N000003 HC RX IP 258 OP 636: Performed by: PHYSICIAN ASSISTANT

## 2021-08-30 PROCEDURE — 85027 COMPLETE CBC AUTOMATED: CPT

## 2021-08-30 PROCEDURE — 71045 X-RAY EXAM CHEST 1 VIEW: CPT

## 2021-08-30 PROCEDURE — 250N000013 HC RX MED GY IP 250 OP 250 PS 637: Performed by: PHYSICIAN ASSISTANT

## 2021-08-30 PROCEDURE — 83735 ASSAY OF MAGNESIUM: CPT

## 2021-08-30 PROCEDURE — 84100 ASSAY OF PHOSPHORUS: CPT

## 2021-08-30 PROCEDURE — 97110 THERAPEUTIC EXERCISES: CPT | Mod: GO | Performed by: OCCUPATIONAL THERAPIST

## 2021-08-30 PROCEDURE — 71045 X-RAY EXAM CHEST 1 VIEW: CPT | Mod: 26 | Performed by: RADIOLOGY

## 2021-08-30 RX ORDER — OXYCODONE HYDROCHLORIDE 5 MG/1
5-10 TABLET ORAL EVERY 4 HOURS PRN
Status: DISCONTINUED | OUTPATIENT
Start: 2021-08-30 | End: 2021-09-01 | Stop reason: HOSPADM

## 2021-08-30 RX ORDER — HYDROMORPHONE HCL IN WATER/PF 6 MG/30 ML
0.2 PATIENT CONTROLLED ANALGESIA SYRINGE INTRAVENOUS ONCE
Status: COMPLETED | OUTPATIENT
Start: 2021-08-30 | End: 2021-08-31

## 2021-08-30 RX ADMIN — IPRATROPIUM BROMIDE 0.5 MG: 0.5 SOLUTION RESPIRATORY (INHALATION) at 02:31

## 2021-08-30 RX ADMIN — POLYETHYLENE GLYCOL 3350 17 G: 17 POWDER, FOR SOLUTION ORAL at 07:36

## 2021-08-30 RX ADMIN — HYDROMORPHONE HYDROCHLORIDE 0.2 MG: 0.2 INJECTION, SOLUTION INTRAMUSCULAR; INTRAVENOUS; SUBCUTANEOUS at 18:51

## 2021-08-30 RX ADMIN — SODIUM CHLORIDE 500 ML: 9 INJECTION, SOLUTION INTRAVENOUS at 10:04

## 2021-08-30 RX ADMIN — HYDROMORPHONE HYDROCHLORIDE 0.2 MG: 0.2 INJECTION, SOLUTION INTRAMUSCULAR; INTRAVENOUS; SUBCUTANEOUS at 07:36

## 2021-08-30 RX ADMIN — OXYCODONE HYDROCHLORIDE 10 MG: 5 TABLET ORAL at 10:50

## 2021-08-30 RX ADMIN — ALBUTEROL SULFATE 4 PUFF: 90 AEROSOL, METERED RESPIRATORY (INHALATION) at 15:38

## 2021-08-30 RX ADMIN — ALBUTEROL SULFATE 4 PUFF: 90 AEROSOL, METERED RESPIRATORY (INHALATION) at 07:36

## 2021-08-30 RX ADMIN — GABAPENTIN 100 MG: 100 CAPSULE ORAL at 07:36

## 2021-08-30 RX ADMIN — IPRATROPIUM BROMIDE 0.5 MG: 0.5 SOLUTION RESPIRATORY (INHALATION) at 21:35

## 2021-08-30 RX ADMIN — OXYCODONE HYDROCHLORIDE 10 MG: 5 TABLET ORAL at 23:54

## 2021-08-30 RX ADMIN — HYDROMORPHONE HYDROCHLORIDE 0.2 MG: 0.2 INJECTION, SOLUTION INTRAMUSCULAR; INTRAVENOUS; SUBCUTANEOUS at 13:16

## 2021-08-30 RX ADMIN — ALBUTEROL SULFATE 4 PUFF: 90 AEROSOL, METERED RESPIRATORY (INHALATION) at 19:34

## 2021-08-30 RX ADMIN — ENOXAPARIN SODIUM 40 MG: 40 INJECTION SUBCUTANEOUS at 15:38

## 2021-08-30 RX ADMIN — HYDROMORPHONE HYDROCHLORIDE 0.2 MG: 0.2 INJECTION, SOLUTION INTRAMUSCULAR; INTRAVENOUS; SUBCUTANEOUS at 09:10

## 2021-08-30 RX ADMIN — ACETAMINOPHEN 975 MG: 325 TABLET, FILM COATED ORAL at 07:36

## 2021-08-30 RX ADMIN — LIDOCAINE 1 PATCH: 560 PATCH PERCUTANEOUS; TOPICAL; TRANSDERMAL at 07:37

## 2021-08-30 RX ADMIN — ACETAMINOPHEN 975 MG: 325 TABLET, FILM COATED ORAL at 13:15

## 2021-08-30 RX ADMIN — OXYCODONE HYDROCHLORIDE 10 MG: 5 TABLET ORAL at 15:38

## 2021-08-30 RX ADMIN — ACETAMINOPHEN 975 MG: 325 TABLET, FILM COATED ORAL at 02:19

## 2021-08-30 RX ADMIN — OXYCODONE HYDROCHLORIDE 10 MG: 10 TABLET ORAL at 04:30

## 2021-08-30 RX ADMIN — GABAPENTIN 100 MG: 100 CAPSULE ORAL at 13:15

## 2021-08-30 RX ADMIN — ALBUTEROL SULFATE 4 PUFF: 90 AEROSOL, METERED RESPIRATORY (INHALATION) at 11:57

## 2021-08-30 RX ADMIN — IPRATROPIUM BROMIDE 0.5 MG: 0.5 SOLUTION RESPIRATORY (INHALATION) at 14:31

## 2021-08-30 RX ADMIN — IPRATROPIUM BROMIDE 0.5 MG: 0.5 SOLUTION RESPIRATORY (INHALATION) at 08:45

## 2021-08-30 RX ADMIN — OXYCODONE HYDROCHLORIDE 10 MG: 5 TABLET ORAL at 19:35

## 2021-08-30 ASSESSMENT — ACTIVITIES OF DAILY LIVING (ADL)
ADLS_ACUITY_SCORE: 13

## 2021-08-30 ASSESSMENT — MIFFLIN-ST. JEOR: SCORE: 1307.58

## 2021-08-30 NOTE — PROGRESS NOTES
Thoracic Surgery Progress Note -   Pt: Yolanda Cheek  MRN: [id number]     24 hr events:   -Hb down to 7.1. Will transfuse 1 unit PRBCs.     Temp:  [98.3  F (36.8  C)-99.5  F (37.5  C)] 98.6  F (37  C)  Pulse:  [] 97  Resp:  [16-20] 20  BP: ()/(47-57) 99/57  SpO2:  [90 %-99 %] 95 %    I/O last 3 completed shifts:  In: 241 [P.O.:240; I.V.:1]  Out: 1890 [Urine:1500; Chest Tube:390]    Resp: 20      Physical Exam:  Gen: Alert and oriented. No acute distress.  Pulm: Respirations non labored.  CV: Regular rate and rhythm.  Chest: CT output appears serosanguinous.   - Incision:  C/D/I.  Abd: Abdomen soft, non-tender, non-distended    Labs reviewed in full; available in Epic.  Results for orders placed or performed during the hospital encounter of 08/27/21 (from the past 24 hour(s))   XR Chest Port 1 View    Narrative    Exam: XR CHEST PORT 1 VIEW, 8/29/2021 2:28 PM    Indication: post chest tube clamp    Comparison: 8/28/2021    Findings:   Stable right apically directed chest tube. Right middle lobectomy  changes. Probable trace right apical pneumothorax. Right chest  wall/neck subcutaneous emphysema. The cardiomediastinal silhouette and  pulmonary vasculature are within normal limits. Streaky bilateral  perihilar and right basilar opacities, mild asymmetric elevation of  the right hemidiaphragm. Chronic right rib fracture deformity.      Impression    Impression:   1. Stable suspected trace right apical pneumothorax with chest tube in  place. Recommend attention on follow-up.  2. Stable perihilar and right basilar atelectasis.    RADHA SANTANA DO         SYSTEM ID:  [id number]   XR Chest Port 1 View    Narrative    EXAM: XR CHEST PORT 1 VIEW  8/30/2021 6:30 AM     HISTORY:  interval CXR following RML lobectomy       COMPARISON:  8/29/2021    FINDINGS: Single view of the chest. Postsurgical changes of the chest.  Similar positioning of right sided chest tube. Streaky opacities of  the right lower lobe,  unchanged. Trace right pleural effusion,  unchanged. Streaky left lower lobe opacity, stable. No pneumothorax.      Impression    IMPRESSION:   1. Stable streaky opacities of the right lower lobe, unchanged.  2. Trace right pleural effusion. Stable positioning of chest tube.    I have personally reviewed the examination and initial interpretation  and I agree with the findings.    PAKO PHAN MD         SYSTEM ID:  [id number]   Basic metabolic panel   Result Value Ref Range    Sodium 136 133 - 144 mmol/L    Potassium 3.6 3.4 - 5.3 mmol/L    Chloride 104 94 - 109 mmol/L    Carbon Dioxide (CO2)      Anion Gap      Urea Nitrogen      Creatinine      Calcium      Glucose      GFR Estimate     CBC with platelets   Result Value Ref Range    WBC Count 7.1 4.0 - 11.0 10e3/uL    RBC Count 1.90 (L) 3.80 - 5.20 10e6/uL    Hemoglobin 7.0 (L) 11.7 - 15.7 g/dL    Hematocrit 20.1 (L) 35.0 - 47.0 %     (H) 78 - 100 fL    MCH 36.8 (H) 26.5 - 33.0 pg    MCHC 34.8 31.5 - 36.5 g/dL    RDW 15.5 (H) 10.0 - 15.0 %    Platelet Count 229 150 - 450 10e3/uL       A/P  Yolanda Cheek is a 56 year old female s/p U-VATS converted to thoracotomy, RML sleeve lobectomy and MLND.     Acute pain: Pain well controlled. She underwent cryoablation of intercostal nerves. Continue Gabapentin for 1 month.   Resp: Remove pleural tube today.   Hypovolemia and acute blood loss anemia: Will transfuse one unit of PRBCs. Recheck Hb post-transfusion.   HLIV.  Regular diet.   DVT prophylaxis: Enoxaparin   Dispo: Likely discharge home tomorrow.     Terrance Deng MD

## 2021-08-30 NOTE — PLAN OF CARE
"BP 91/56   Pulse 104   Temp 99.5  F (37.5  C) (Oral)   Resp 16   Ht 1.702 m (5' 7\")   Wt 66.8 kg (147 lb 3.2 oz)   SpO2 99%   BMI 23.05 kg/m      BP 91/56   Pulse 104   Temp 99.5  F (37.5  C) (Oral)   Resp 16   Ht 1.702 m (5' 7\")   Wt 66.8 kg (147 lb 3.2 oz)   SpO2 99%   BMI 23.05 kg/m       Shift time: 3319-8784      Reason for admission: POD#2 s/p (R) middle lobectomy, lump node dissection, flexible bronchoscopy. Lung mass.     Neuros: A&Ox4. Calls appropriately. Denies any numbness/tingling.     Cardiac: WDL ex soft BP- within range. Denies any chest pain.     Respiratory: WDL. CT clamped. Denies any SOB on 2L NC.     GI/Gu: Voiding spontaneously. No BM this shift.     Skin/Incisions: Surgical site, CT site and lap site. Changed CT dressing x2.     Pain: PRN dilaudid given x1. Scheduled tylenol and oxy.     Diet: Regular diet.     Activity: Ax1.     Plan: Continue with POC. Hgb was 7.3- from note looks like they will recheck in AM.     "

## 2021-08-30 NOTE — PLAN OF CARE
BP low this am, 80's/50's. Dizzy when up ambulating. Md notified. Hgb 7.2. One unit PRBC given along w/ bolus. BP 97/57, slightly improved. Right chest tube removed this am after CXR. Large amount of fluid leaking from old chest tube site when patient gets up to walk/cough. Dressing reinforced x 4 today. Incision site w/ dermabond intact. Moderate pain after chest tube pull. Oxy and extra dilaudid dose given. Unable to use IS well today, gets to 250 only. Up to bathroom to void good amounts. No bm today. Strong cough, unproductive.

## 2021-08-30 NOTE — OP NOTE
Procedure Date: 08/27/2021    PREOPERATIVE DIAGNOSIS:  Right middle lobe non-small cell lung cancer.    POSTOPERATIVE DIAGNOSIS:  Right middle lobe non-small cell lung cancer.    PROCEDURE PERFORMED:    1.  Right uniportal thoracoscopic middle lobe sleeve lobectomy with conversion to thoracotomy.    2.  Intercostal muscle mobilization and reinforcement of bronchial anastomosis.  3.  Mediastinal lymph node dissection.  4.  Flexible bronchoscopy.  5.  Intraoperative cryoablation of 7 intercostal nerves.    SURGEON:  Eliezer Knox MD (present for the entire procedure).    RESIDENT SURGEON:  Jose D Lindo MD    ANESTHESIA:  General.    ESTIMATED BLOOD LOSS:  500 mL    COMPLICATIONS:  None immediate.    FINDINGS:  The procedure was very challenging.  Eventually after we had completely mobilized the fissures, which were essentially complete and ligated the arteries and the vein, I could not ascertain the lower limit of the mass, which was very centrally located and I could not tell if it was involving the bronchus of the right lower lobe.  For this reason, I converted to a thoracotomy to be able to precisely delineate the mass.  Once I delineated it I was certain that I needed to do middle lobe sleeve resection.  The frozen section of bronchial margins were negative.    DESCRIPTION OF PROCEDURE:  With the patient in the left lateral decubitus under general anesthesia with double-lumen tube in place, the right chest was prepared and draped in the conventional fashion.  I made a 3 cm x intercostal space incision, split the muscles, and placed a wound protector.  We then entered the pleural cavity without difficulty.  I identified no evidence of metastatic disease and did an extensive lymphadenectomy of lymph nodes in station 7, 4R, and 2R.  Intraoperative frozen section on lymph nodes in station 7 and 4R as negative for malignancy.  We then identified the fissures, which were complete and we freed up the central portion  of the fissures to clearly see the pulmonary artery.  We ligated 2 right middle lobe artery branches with 0 silks and clipped the branches on the specimen side and then we concentrated on the middle lobe vein.  The tumor extended inferiorly and actually the phrenic nerve was pulled somewhat into the hilum.  We  the phrenic nerve sharply and sent some fatty tissue between the tumor and the phrenic nerve for frozen section, which was negative for malignancy.  After this, we started working on the vein and the tumor extended quite centrally that the best way to control the vein and have good margin was to place 2 interrupted pledgeted 3-0 Prolene stitches at the base of the vein and then divided sharply above that.  This led to some bleeding from the stump but we could control it.  We then continued the dissection.  We could identify the bronchus on the upper portion, but inferiorly towards the lower lobe we could not clearly see the takeoff of the lower lobe and junction of the lower middle lobe.  After trying to figure this out for a bit thoracoscopically, I decided it would be best to perform a thoracotomy to finish delineating the tumor and optimizing our resection.  We then converted to a thoracotomy and we mobilized the intercostal muscle flap anticipating that we would perform a sleeve resection.  After this, we had excellent exposure and then we could clearly palpate the mass.  I then incised the middle lobe bronchus flush with the bronchus intermedius and cut across the bronchus intermedius at the level of the middle lobe bronchus.  I then cut the lower lobe bronchus just above the takeoff of segments 7 and 6.  We then removed the specimen with a sleeve bronchus and frozen section was negative on the bronchial margin.    We then performed an end-to-end anastomosis with interrupted 3-0 and 4-0 Vicryl and performed a bronchoscopy to ensure proper patency of all the segments.  We then tested it under  immersion with insufflation and there was no leak.    Finally, I placed an intercostal muscle flap between the pulmonary artery and the bronchus and secured it anteriorly to the mediastinum with a 0 Vicryl stitch.  We then ensured hemostasis and we tacked the upper lobe anteriorly to the lower lobe to prevent torsion since the fissures were complete.    We ablated the intercostal nerves from 3-9 with cryoablation then placed a 24-English chest tube, placed pericostal stitches, and then re-insufflated the lung.  We then closed the muscle with absorbable sutures and the skin in a conventional fashion.  At the end, we performed another bronchoscopy to clear out secretions and again verify patency of all the segment of the lower lobe.    The patient tolerated the procedure well.    Eliezer Knox MD        D: 2021   T: 2021   MT: [id number]    Name:     CIERRA TAYLOR  MRN:      [id number]        Account:        [id number]   :      1965           Procedure Date: 2021     Document: [id number]

## 2021-08-30 NOTE — PROGRESS NOTES
Shift 1900 - 2300    No significant changes except dressing to chest tube was changed, dressing now CDI. Chest tube remains clamped per MD order (in notes). Dyspnea with activity, placed back on 2L O2 after returning from bathroom. Pain to chest tube region managed with scheduled tylenol and scheduled oxycodone. Cont w/ current POC

## 2021-08-30 NOTE — PROGRESS NOTES
"THORACIC SURGERY PROGRESS NOTE     S: No overnight events. Having ongoing pain control issues this morning. With hypotension this morning as well with pressures of 80s/50s. Was symptomatic with dizziness.      O:  BP 99/57 (BP Location: Right arm)   Pulse 97   Temp 98.6  F (37  C) (Oral)   Resp 20   Ht 1.702 m (5' 7\")   Wt 66.8 kg (147 lb 3.2 oz)   SpO2 95%   BMI 23.05 kg/m       Gen: NAD, AAO  Resp: non labored breathing on room air   chest tube clamped with serous output clamped in tube. Surrounding leakage around her chest tube site  CV: RRR     Chest tube 390 ml out yesterday prior to clamping  UOP 1000 ml yest, 1300 overnight    CXR:   IMPRESSION:   1. Stable streaky opacities of the right lower lobe, unchanged.  2. Trace right pleural effusion. Stable positioning of chest tube.     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    - Chest tube removed  - Symptomatic anemia to 7.0 this morning with dizziness. Will order 1uPRBCs.    - Recheck AM labs   - Continue pain control with scheduled tylenol, gabapentin, lidocaine patch, oxycodone. Increased frequency of oxycodone availability.   - Wean O2  - Lovenox  - Albuterol and ipratropium julio césar  - Possible discharge tomorrow pending repeat hgb, blood pressures, and pain control    Seen with thoracic fellow, will discuss with staff.    Anayeli Monson PGY-3        "

## 2021-08-30 NOTE — PROGRESS NOTES
"CLINICAL NUTRITION SERVICES - ASSESSMENT NOTE     Nutrition Prescription    RECOMMENDATIONS FOR MDs/PROVIDERS TO ORDER:  - none at present.     Malnutrition Status:    Patient does not meet two of the established criteria necessary for diagnosing malnutrition    Recommendations already ordered by Registered Dietitian (RD):  - start Chocolate Ensure Enlive with lunch and dinner.     Future/Additional Recommendations:  - Follow po intake, GI function,      REASON FOR ASSESSMENT  Yolanda Cheek is a/an 56 year old female assessed by the dietitian for Admission Nutrition Risk Screen for positive for wt loss of 14-23# and decreased po intake.     Chart reviewed.  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  Per care rounds this AM, chest tube clamped with large amount of leakage and poor pain control overnight.  Having low b/p and dizziness this AM. To receive 1 UPRBCs.    NUTRITION HISTORY  Issues with chronic constipation that worsened after she stopped drinking alcohol in 9/2020. She had a poor appetite with this constipation and abdominal bloating. She was admitted to the ED on Tuesday 6/29/21 with n/v due to drinking a lot on Sunday 6/27 and vomiting since Monday morning.  The alcohol hx section of her ED visit noted that she drank 5-6 drinks/day.  RN charted she hadn't had anything to drink x 1 month prior to that. She returned to ED on 7/8 w/ continued n/v. Today she notes that she has not been drinking alcohol since that time.  She is having daily BMs with daily miralax \"now that I'm off the oxy.\"   She feels her appetite improved since last month.  She " "has a Cascade Good Start shake for breakfast with a slice of toast and fruit. (She started Cascade Good Start about 6 months ago.) For lunch she has a meat sandwich and milk.  For dinner she may have pancakes or Swedish toast or hamburger helper and a glass of milk. She wears dentures but find that she can eat better without them as they need to be refitted.  She didn't bring them to the hospital as she was expecting a short stay.     CURRENT NUTRITION ORDERS  Diet: Regular  Intake/Tolerance: didn't order breakfast until late--fair appetite.     Received 500 ml NS bolus this AM.     LABS  Labs reviewed  BUN 5, Cr 0.42 (low perhaps due to low muscle mass).  Electrolytes wnl.  Hgb 7.0--receiving blood.     GI Last BM 8/27 and has been having daily BMs prior to admission.      MEDICATIONS  Medications reviewed  Lasix completed, miralax q day,   ANTHROPOMETRICS  Height: 170.2 cm (5' 7\")  Most Recent Weight: 66.8 kg (147 lb 3.2 oz)    IBW: 61.4 kg (109 % IBW)  BMI: Normal BMI  Weight History: Net wt loss of 5% over the past 10 months--not significant.  Wt did drop down 15# ather lowest between the end of June through July but gained some of the weight back over the past month with improved po intake.   Wt Readings from Last 20 Encounters:   08/29/21 66.8 kg (147 lb 3.2 oz)   08/25/21 66 kg (145 lb 8.1 oz) admission   08/25/21 65 kg (143 lb 3.2 oz)   07/23/21 63.3 kg (139 lb 9.6 oz)   07/13/21  65.6 kg (144 lb 11.2 oz) clinic      07/09/21 63.4 kg (139 lb 11.2 oz) ED   06/30/21  63.2 kg (139 lb 6.4 oz) ED      06/01/21 64.9 kg (143 lb 1.6 oz)   05/12/21 68.1 kg (150 lb 1.6 oz)   03/22/21  65.4 kg (144 lb 1.6 oz)       02/17/21  68.9 kg (152 lb) clinic      10/09/20 69.7 kg (153 lb 9.6 oz) clinic   09/21/20 61.2 kg (135 lb) suspect verbal   12/05/16 69.9 kg (154 lb)   09/12/16 70.4 kg (155 lb 4 oz)   08/18/16 67.1 kg (148 lb)   07/25/16 66.4 kg (146 lb 4.8 oz)       Dosing Weight: 66 kg    ASSESSED NUTRITION " NEEDS  Estimated Energy Needs: 4200-0409+ kcals/day (25 - 30+ kcals/kg)  Justification: Maintenance and Post-op  Estimated Protein Needs: 79-99 grams protein/day (1.2 - 1.5 grams of pro/kg)  Justification: Post-op  Estimated Fluid Needs:  (1 mL/kcal)   Justification: Maintenance and Per provider pending fluid status    PHYSICAL FINDINGS  See malnutrition section below.    MALNUTRITION  % Intake: Decreased intake does not meet criteria over past few months.   % Weight Loss: Weight loss does not meet criteria  Subcutaneous Fat Loss: None observed  Muscle Loss: Temporal:  mild, Upper arm (bicep, tricep):  Moderate, Dorsal hand: mild, Upper leg (quadricep, hamstring):  moderate, Patellar region:  moderate and Posterior calf:  moderate  Fluid Accumulation/Edema: None noted  Malnutrition Diagnosis: Patient does not meet two of the established criteria necessary for diagnosing malnutrition    NUTRITION DIAGNOSIS  Increased nutrient needs for protein/calories related to recent surgery/cancer dx as evidenced by post op protein needs of 1.2-1.5 gm/kg.      INTERVENTIONS  Implementation  Nutrition Education: Discussed increased need for protein, tips for increasing protein at home (2nd supplement drink, including good protein choice with evening meal, discussed protein supplement options here, discussed utilizing prn bowel rx to stay ahead of constipation w/ need for narcotics for pain. Explained availability of RD consult via oncologist if she had return of poor po intake and/or weight loss.   Medical food supplement therapy   See recommendations above.     Goals  Patient to consume % of nutritionally adequate meal trays TID, or the equivalent with supplements/snacks.     Monitoring/Evaluation  Progress toward goals will be monitored and evaluated per protocol.    Miriam Sanz RD, LD   7B (M-F) Pager: [phone]  RD Weekend Pager: [phone]

## 2021-08-31 ENCOUNTER — APPOINTMENT (OUTPATIENT)
Dept: OCCUPATIONAL THERAPY | Facility: CLINIC | Age: 56
End: 2021-08-31
Attending: THORACIC SURGERY (CARDIOTHORACIC VASCULAR SURGERY)
Payer: COMMERCIAL

## 2021-08-31 ENCOUNTER — APPOINTMENT (OUTPATIENT)
Dept: GENERAL RADIOLOGY | Facility: CLINIC | Age: 56
End: 2021-08-31
Attending: THORACIC SURGERY (CARDIOTHORACIC VASCULAR SURGERY)
Payer: COMMERCIAL

## 2021-08-31 ENCOUNTER — APPOINTMENT (OUTPATIENT)
Dept: CT IMAGING | Facility: CLINIC | Age: 56
End: 2021-08-31
Attending: PHYSICIAN ASSISTANT
Payer: COMMERCIAL

## 2021-08-31 LAB
ANION GAP SERPL CALCULATED.3IONS-SCNC: 6 MMOL/L (ref 3–14)
BUN SERPL-MCNC: 4 MG/DL (ref 7–30)
CALCIUM SERPL-MCNC: 8.4 MG/DL (ref 8.5–10.1)
CHLORIDE BLD-SCNC: 100 MMOL/L (ref 94–109)
CO2 SERPL-SCNC: 29 MMOL/L (ref 20–32)
CREAT SERPL-MCNC: 0.41 MG/DL (ref 0.52–1.04)
ERYTHROCYTE [DISTWIDTH] IN BLOOD BY AUTOMATED COUNT: 20.1 % (ref 10–15)
GFR SERPL CREATININE-BSD FRML MDRD: >90 ML/MIN/1.73M2
GLUCOSE BLD-MCNC: 120 MG/DL (ref 70–99)
HCT VFR BLD AUTO: 27.8 % (ref 35–47)
HGB BLD-MCNC: 9.4 G/DL (ref 11.7–15.7)
MAGNESIUM SERPL-MCNC: 1.6 MG/DL (ref 1.6–2.3)
MCH RBC QN AUTO: 33.6 PG (ref 26.5–33)
MCHC RBC AUTO-ENTMCNC: 33.8 G/DL (ref 31.5–36.5)
MCV RBC AUTO: 99 FL (ref 78–100)
PHOSPHATE SERPL-MCNC: 4.6 MG/DL (ref 2.5–4.5)
PLATELET # BLD AUTO: 287 10E3/UL (ref 150–450)
POTASSIUM BLD-SCNC: 3.9 MMOL/L (ref 3.4–5.3)
RBC # BLD AUTO: 2.8 10E6/UL (ref 3.8–5.2)
SODIUM SERPL-SCNC: 135 MMOL/L (ref 133–144)
WBC # BLD AUTO: 8.9 10E3/UL (ref 4–11)

## 2021-08-31 PROCEDURE — 97530 THERAPEUTIC ACTIVITIES: CPT | Mod: GO

## 2021-08-31 PROCEDURE — 84100 ASSAY OF PHOSPHORUS: CPT

## 2021-08-31 PROCEDURE — 250N000011 HC RX IP 250 OP 636: Performed by: STUDENT IN AN ORGANIZED HEALTH CARE EDUCATION/TRAINING PROGRAM

## 2021-08-31 PROCEDURE — 71045 X-RAY EXAM CHEST 1 VIEW: CPT | Mod: 26 | Performed by: RADIOLOGY

## 2021-08-31 PROCEDURE — 250N000011 HC RX IP 250 OP 636: Performed by: PHYSICIAN ASSISTANT

## 2021-08-31 PROCEDURE — 80048 BASIC METABOLIC PNL TOTAL CA: CPT

## 2021-08-31 PROCEDURE — 74018 RADEX ABDOMEN 1 VIEW: CPT | Mod: 26 | Performed by: RADIOLOGY

## 2021-08-31 PROCEDURE — 120N000002 HC R&B MED SURG/OB UMMC

## 2021-08-31 PROCEDURE — 83735 ASSAY OF MAGNESIUM: CPT

## 2021-08-31 PROCEDURE — 250N000013 HC RX MED GY IP 250 OP 250 PS 637

## 2021-08-31 PROCEDURE — 250N000013 HC RX MED GY IP 250 OP 250 PS 637: Performed by: STUDENT IN AN ORGANIZED HEALTH CARE EDUCATION/TRAINING PROGRAM

## 2021-08-31 PROCEDURE — 74177 CT ABD & PELVIS W/CONTRAST: CPT

## 2021-08-31 PROCEDURE — 250N000011 HC RX IP 250 OP 636

## 2021-08-31 PROCEDURE — 74018 RADEX ABDOMEN 1 VIEW: CPT

## 2021-08-31 PROCEDURE — 97535 SELF CARE MNGMENT TRAINING: CPT | Mod: GO

## 2021-08-31 PROCEDURE — 71045 X-RAY EXAM CHEST 1 VIEW: CPT

## 2021-08-31 PROCEDURE — 85027 COMPLETE CBC AUTOMATED: CPT

## 2021-08-31 PROCEDURE — 94640 AIRWAY INHALATION TREATMENT: CPT

## 2021-08-31 PROCEDURE — 36415 COLL VENOUS BLD VENIPUNCTURE: CPT

## 2021-08-31 PROCEDURE — 94640 AIRWAY INHALATION TREATMENT: CPT | Mod: 76

## 2021-08-31 PROCEDURE — 74177 CT ABD & PELVIS W/CONTRAST: CPT | Mod: 26 | Performed by: RADIOLOGY

## 2021-08-31 PROCEDURE — 999N000157 HC STATISTIC RCP TIME EA 10 MIN

## 2021-08-31 PROCEDURE — 250N000009 HC RX 250: Performed by: STUDENT IN AN ORGANIZED HEALTH CARE EDUCATION/TRAINING PROGRAM

## 2021-08-31 PROCEDURE — 250N000013 HC RX MED GY IP 250 OP 250 PS 637: Performed by: PHYSICIAN ASSISTANT

## 2021-08-31 RX ORDER — IOPAMIDOL 755 MG/ML
91 INJECTION, SOLUTION INTRAVASCULAR ONCE
Status: COMPLETED | OUTPATIENT
Start: 2021-08-31 | End: 2021-08-31

## 2021-08-31 RX ORDER — ACETAMINOPHEN 325 MG/1
975 TABLET ORAL EVERY 6 HOURS
COMMUNITY
Start: 2021-08-31

## 2021-08-31 RX ORDER — GABAPENTIN 100 MG/1
100 CAPSULE ORAL 3 TIMES DAILY
Qty: 180 CAPSULE | Refills: 0 | Status: SHIPPED | OUTPATIENT
Start: 2021-08-31 | End: 2021-10-30

## 2021-08-31 RX ORDER — BISACODYL 10 MG
10 SUPPOSITORY, RECTAL RECTAL ONCE
Status: COMPLETED | OUTPATIENT
Start: 2021-08-31 | End: 2021-08-31

## 2021-08-31 RX ADMIN — ALBUTEROL SULFATE 4 PUFF: 90 AEROSOL, METERED RESPIRATORY (INHALATION) at 20:21

## 2021-08-31 RX ADMIN — IPRATROPIUM BROMIDE 0.5 MG: 0.5 SOLUTION RESPIRATORY (INHALATION) at 13:23

## 2021-08-31 RX ADMIN — DOCUSATE SODIUM 286 ML: 50 LIQUID ORAL at 10:28

## 2021-08-31 RX ADMIN — HYDROMORPHONE HYDROCHLORIDE 0.2 MG: 0.2 INJECTION, SOLUTION INTRAMUSCULAR; INTRAVENOUS; SUBCUTANEOUS at 15:25

## 2021-08-31 RX ADMIN — IPRATROPIUM BROMIDE 0.5 MG: 0.5 SOLUTION RESPIRATORY (INHALATION) at 08:15

## 2021-08-31 RX ADMIN — ALBUTEROL SULFATE 4 PUFF: 90 AEROSOL, METERED RESPIRATORY (INHALATION) at 13:48

## 2021-08-31 RX ADMIN — TRAZODONE HYDROCHLORIDE 50 MG: 50 TABLET ORAL at 20:21

## 2021-08-31 RX ADMIN — HYDROMORPHONE HYDROCHLORIDE 0.2 MG: 0.2 INJECTION, SOLUTION INTRAMUSCULAR; INTRAVENOUS; SUBCUTANEOUS at 06:00

## 2021-08-31 RX ADMIN — HYDROMORPHONE HYDROCHLORIDE 0.2 MG: 0.2 INJECTION, SOLUTION INTRAMUSCULAR; INTRAVENOUS; SUBCUTANEOUS at 08:37

## 2021-08-31 RX ADMIN — OXYCODONE HYDROCHLORIDE 10 MG: 5 TABLET ORAL at 03:44

## 2021-08-31 RX ADMIN — SENNOSIDES 10 ML: 8.8 LIQUID ORAL at 08:34

## 2021-08-31 RX ADMIN — GABAPENTIN 100 MG: 100 CAPSULE ORAL at 13:48

## 2021-08-31 RX ADMIN — ACETAMINOPHEN 975 MG: 325 TABLET, FILM COATED ORAL at 01:40

## 2021-08-31 RX ADMIN — LIDOCAINE 1 PATCH: 560 PATCH PERCUTANEOUS; TOPICAL; TRANSDERMAL at 08:33

## 2021-08-31 RX ADMIN — ENOXAPARIN SODIUM 40 MG: 40 INJECTION SUBCUTANEOUS at 15:25

## 2021-08-31 RX ADMIN — HYDROMORPHONE HYDROCHLORIDE 0.2 MG: 0.2 INJECTION, SOLUTION INTRAMUSCULAR; INTRAVENOUS; SUBCUTANEOUS at 10:27

## 2021-08-31 RX ADMIN — GABAPENTIN 100 MG: 100 CAPSULE ORAL at 08:34

## 2021-08-31 RX ADMIN — ACETAMINOPHEN 975 MG: 325 TABLET, FILM COATED ORAL at 13:48

## 2021-08-31 RX ADMIN — ACETAMINOPHEN 975 MG: 325 TABLET, FILM COATED ORAL at 20:21

## 2021-08-31 RX ADMIN — GABAPENTIN 100 MG: 100 CAPSULE ORAL at 20:21

## 2021-08-31 RX ADMIN — POLYETHYLENE GLYCOL 3350 17 G: 17 POWDER, FOR SOLUTION ORAL at 08:34

## 2021-08-31 RX ADMIN — HYDROMORPHONE HYDROCHLORIDE 0.2 MG: 0.2 INJECTION, SOLUTION INTRAMUSCULAR; INTRAVENOUS; SUBCUTANEOUS at 20:21

## 2021-08-31 RX ADMIN — IPRATROPIUM BROMIDE 0.5 MG: 0.5 SOLUTION RESPIRATORY (INHALATION) at 21:28

## 2021-08-31 RX ADMIN — BISACODYL 10 MG: 10 SUPPOSITORY RECTAL at 08:31

## 2021-08-31 RX ADMIN — OXYCODONE HYDROCHLORIDE 10 MG: 5 TABLET ORAL at 18:56

## 2021-08-31 RX ADMIN — ALBUTEROL SULFATE 4 PUFF: 90 AEROSOL, METERED RESPIRATORY (INHALATION) at 15:25

## 2021-08-31 RX ADMIN — ACETAMINOPHEN 975 MG: 325 TABLET, FILM COATED ORAL at 08:33

## 2021-08-31 RX ADMIN — IOPAMIDOL 91 ML: 755 INJECTION, SOLUTION INTRAVENOUS at 10:12

## 2021-08-31 RX ADMIN — ALBUTEROL SULFATE 4 PUFF: 90 AEROSOL, METERED RESPIRATORY (INHALATION) at 08:33

## 2021-08-31 ASSESSMENT — ACTIVITIES OF DAILY LIVING (ADL)
ADLS_ACUITY_SCORE: 13

## 2021-08-31 NOTE — PLAN OF CARE
"1930-0730    /57 (BP Location: Right arm)   Pulse 107   Temp 97.8  F (36.6  C) (Oral)   Resp 18   Ht 1.702 m (5' 7\")   Wt 67.5 kg (148 lb 12.8 oz)   SpO2 97%   BMI 23.31 kg/m      Activity: up to bathroom with standby assist   Neuros: alert and orientated x 4, calm and cooperative   Cardiac: WNL  Respiratory: O2 sats 97% on 2L NC, some dypnea with exertion   GI/: abdomen distended, had small BM on 8/30, voiding spont (not saving)   Diet: regular   Skin: old chest tube site leaking, changed x 1 overnight   Lines: PIV   Incisions/Drains: chest inc's CDI/BANDAR, no drains    Labs: reviewed   Pain/nausea: \"partial\" pain control taking scheduled tylenol and prn oxycodone, no nausea    New changes this shift: none   Plan: encourage ambulation, bowel meds, continue POC     "

## 2021-08-31 NOTE — PROGRESS NOTES
"THORACIC SURGERY PROGRESS NOTE     S: No overnight events. Patient received 1uPRBCs yesterday for symptomatic anemia of 7.0. Blood pressure improved following this. However patient having abdominal discomfort and bloating this morning. Has not yet had a bowel movement post-operatively despite bowel regimen.      O:  /57 (BP Location: Right arm)   Pulse 107   Temp 97.8  F (36.6  C) (Oral)   Resp 18   Ht 1.702 m (5' 7\")   Wt 67.5 kg (148 lb 12.8 oz)   SpO2 97%   BMI 23.31 kg/m       Gen: NAD, AAO  Resp: non labored breathing on room air   chest tube clamped with serous output clamped in tube. Surrounding leakage around her chest tube site  Abdomen distended, tympanitic, tender, no peritonitis    CV: RRR     Chest tube removed 8/30  UOP 2620 ml yesterday    AM labs pending      A/P: 55 yo F with 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   - AM AXR  - Increased bowel regimen, added senokot and suppository. Previously on miralax   - Chest tube removed 8/30  - 1uPRBCs received 8/30  - Continue pain control with scheduled tylenol, gabapentin, lidocaine patch, oxycodone. Increased frequency of oxycodone availability.   - Lovenox  - Albuterol and ipratropium julio césar  - Possible discharge today pending improved distention, pain control    Seen with thoracic fellow, will discuss with staff.    Anayeli Monson PGY-3    Addendum 9:00 AM:   AXR with concerns for possible volvulus. CT A/P ordered.     Anayeli Monson PGY-3    "

## 2021-08-31 NOTE — PLAN OF CARE
Patient afebrile, vss. HR 90's-110's. Moderate belly distention, patient feeling miserable. Suppository given, awaiting pink lady enema from pharmacy. Up to bathroom, passing scant gas. No bm yet. Hypo bowel sounds. IV dilaudid for pain. Oral bowel meds given. AXR done and CT abdomen done.     Patient had 2 small liquid bm's after enema, slightly less distended. Ambulated in hallway.

## 2021-09-01 ENCOUNTER — APPOINTMENT (OUTPATIENT)
Dept: GENERAL RADIOLOGY | Facility: CLINIC | Age: 56
End: 2021-09-01
Attending: THORACIC SURGERY (CARDIOTHORACIC VASCULAR SURGERY)
Payer: COMMERCIAL

## 2021-09-01 ENCOUNTER — APPOINTMENT (OUTPATIENT)
Dept: OCCUPATIONAL THERAPY | Facility: CLINIC | Age: 56
End: 2021-09-01
Attending: THORACIC SURGERY (CARDIOTHORACIC VASCULAR SURGERY)
Payer: COMMERCIAL

## 2021-09-01 VITALS
HEIGHT: 67 IN | RESPIRATION RATE: 18 BRPM | TEMPERATURE: 98.2 F | WEIGHT: 148.8 LBS | HEART RATE: 95 BPM | BODY MASS INDEX: 23.35 KG/M2 | DIASTOLIC BLOOD PRESSURE: 51 MMHG | SYSTOLIC BLOOD PRESSURE: 96 MMHG | OXYGEN SATURATION: 98 %

## 2021-09-01 LAB
ANION GAP SERPL CALCULATED.3IONS-SCNC: 3 MMOL/L (ref 3–14)
BUN SERPL-MCNC: 4 MG/DL (ref 7–30)
CALCIUM SERPL-MCNC: 7.9 MG/DL (ref 8.5–10.1)
CHLORIDE BLD-SCNC: 105 MMOL/L (ref 94–109)
CO2 SERPL-SCNC: 29 MMOL/L (ref 20–32)
CREAT SERPL-MCNC: 0.46 MG/DL (ref 0.52–1.04)
ERYTHROCYTE [DISTWIDTH] IN BLOOD BY AUTOMATED COUNT: 20.5 % (ref 10–15)
GFR SERPL CREATININE-BSD FRML MDRD: >90 ML/MIN/1.73M2
GLUCOSE BLD-MCNC: 93 MG/DL (ref 70–99)
HCT VFR BLD AUTO: 24.5 % (ref 35–47)
HGB BLD-MCNC: 8.4 G/DL (ref 11.7–15.7)
MAGNESIUM SERPL-MCNC: 1.6 MG/DL (ref 1.6–2.3)
MCH RBC QN AUTO: 34.7 PG (ref 26.5–33)
MCHC RBC AUTO-ENTMCNC: 34.3 G/DL (ref 31.5–36.5)
MCV RBC AUTO: 101 FL (ref 78–100)
PATH REPORT.COMMENTS IMP SPEC: NORMAL
PATH REPORT.FINAL DX SPEC: NORMAL
PATH REPORT.GROSS SPEC: NORMAL
PATH REPORT.INTRAOP OBS SPEC DOC: NORMAL
PATH REPORT.MICROSCOPIC SPEC OTHER STN: NORMAL
PATH REPORT.MICROSCOPIC SPEC OTHER STN: NORMAL
PATH REPORT.RELEVANT HX SPEC: NORMAL
PATHOLOGY SYNOPTIC REPORT: NORMAL
PHOSPHATE SERPL-MCNC: 3.8 MG/DL (ref 2.5–4.5)
PHOTO IMAGE: NORMAL
PLATELET # BLD AUTO: 279 10E3/UL (ref 150–450)
POTASSIUM BLD-SCNC: 3.8 MMOL/L (ref 3.4–5.3)
RBC # BLD AUTO: 2.42 10E6/UL (ref 3.8–5.2)
SODIUM SERPL-SCNC: 137 MMOL/L (ref 133–144)
WBC # BLD AUTO: 6.4 10E3/UL (ref 4–11)

## 2021-09-01 PROCEDURE — 88360 TUMOR IMMUNOHISTOCHEM/MANUAL: CPT | Mod: 26 | Performed by: PATHOLOGY

## 2021-09-01 PROCEDURE — 88307 TISSUE EXAM BY PATHOLOGIST: CPT | Mod: 26 | Performed by: PATHOLOGY

## 2021-09-01 PROCEDURE — 84100 ASSAY OF PHOSPHORUS: CPT

## 2021-09-01 PROCEDURE — 80048 BASIC METABOLIC PNL TOTAL CA: CPT

## 2021-09-01 PROCEDURE — 88331 PATH CONSLTJ SURG 1 BLK 1SPC: CPT | Mod: 26 | Performed by: PATHOLOGY

## 2021-09-01 PROCEDURE — 71045 X-RAY EXAM CHEST 1 VIEW: CPT | Mod: 26 | Performed by: RADIOLOGY

## 2021-09-01 PROCEDURE — 250N000013 HC RX MED GY IP 250 OP 250 PS 637: Performed by: STUDENT IN AN ORGANIZED HEALTH CARE EDUCATION/TRAINING PROGRAM

## 2021-09-01 PROCEDURE — 88309 TISSUE EXAM BY PATHOLOGIST: CPT | Mod: 26 | Performed by: PATHOLOGY

## 2021-09-01 PROCEDURE — 36415 COLL VENOUS BLD VENIPUNCTURE: CPT

## 2021-09-01 PROCEDURE — 81445 SO NEO GSAP 5-50DNA/DNA&RNA: CPT | Performed by: THORACIC SURGERY (CARDIOTHORACIC VASCULAR SURGERY)

## 2021-09-01 PROCEDURE — 97530 THERAPEUTIC ACTIVITIES: CPT | Mod: GO

## 2021-09-01 PROCEDURE — 94640 AIRWAY INHALATION TREATMENT: CPT

## 2021-09-01 PROCEDURE — 94640 AIRWAY INHALATION TREATMENT: CPT | Mod: 76

## 2021-09-01 PROCEDURE — 71045 X-RAY EXAM CHEST 1 VIEW: CPT

## 2021-09-01 PROCEDURE — 88341 IMHCHEM/IMCYTCHM EA ADD ANTB: CPT | Mod: 26 | Performed by: PATHOLOGY

## 2021-09-01 PROCEDURE — 250N000011 HC RX IP 250 OP 636: Performed by: STUDENT IN AN ORGANIZED HEALTH CARE EDUCATION/TRAINING PROGRAM

## 2021-09-01 PROCEDURE — 88342 IMHCHEM/IMCYTCHM 1ST ANTB: CPT | Mod: 26 | Performed by: PATHOLOGY

## 2021-09-01 PROCEDURE — 999N000157 HC STATISTIC RCP TIME EA 10 MIN

## 2021-09-01 PROCEDURE — 250N000013 HC RX MED GY IP 250 OP 250 PS 637

## 2021-09-01 PROCEDURE — 250N000009 HC RX 250: Performed by: STUDENT IN AN ORGANIZED HEALTH CARE EDUCATION/TRAINING PROGRAM

## 2021-09-01 PROCEDURE — 97535 SELF CARE MNGMENT TRAINING: CPT | Mod: GO

## 2021-09-01 PROCEDURE — 250N000011 HC RX IP 250 OP 636

## 2021-09-01 PROCEDURE — 83735 ASSAY OF MAGNESIUM: CPT

## 2021-09-01 PROCEDURE — 88304 TISSUE EXAM BY PATHOLOGIST: CPT | Mod: 26 | Performed by: PATHOLOGY

## 2021-09-01 PROCEDURE — 88305 TISSUE EXAM BY PATHOLOGIST: CPT | Mod: 26 | Performed by: PATHOLOGY

## 2021-09-01 PROCEDURE — 85027 COMPLETE CBC AUTOMATED: CPT

## 2021-09-01 RX ORDER — OXYCODONE HYDROCHLORIDE 5 MG/1
5-10 TABLET ORAL EVERY 4 HOURS PRN
Qty: 40 TABLET | Refills: 0 | Status: SHIPPED | OUTPATIENT
Start: 2021-09-01 | End: 2021-09-11

## 2021-09-01 RX ORDER — LIDOCAINE 4 G/G
1 PATCH TOPICAL EVERY 24 HOURS
COMMUNITY
Start: 2021-09-01

## 2021-09-01 RX ORDER — FUROSEMIDE 20 MG
20 TABLET ORAL ONCE
Status: COMPLETED | OUTPATIENT
Start: 2021-09-01 | End: 2021-09-01

## 2021-09-01 RX ORDER — ALBUTEROL SULFATE 90 UG/1
4 AEROSOL, METERED RESPIRATORY (INHALATION) 4 TIMES DAILY
Qty: 8 G | Refills: 0 | Status: SHIPPED | OUTPATIENT
Start: 2021-09-01

## 2021-09-01 RX ORDER — SENNA AND DOCUSATE SODIUM 50; 8.6 MG/1; MG/1
1 TABLET, FILM COATED ORAL AT BEDTIME
Qty: 40 TABLET | Refills: 0 | Status: SHIPPED | OUTPATIENT
Start: 2021-09-01

## 2021-09-01 RX ADMIN — ALBUTEROL SULFATE 4 PUFF: 90 AEROSOL, METERED RESPIRATORY (INHALATION) at 12:23

## 2021-09-01 RX ADMIN — POLYETHYLENE GLYCOL 3350 17 G: 17 POWDER, FOR SOLUTION ORAL at 10:11

## 2021-09-01 RX ADMIN — HYDROMORPHONE HYDROCHLORIDE 0.2 MG: 0.2 INJECTION, SOLUTION INTRAMUSCULAR; INTRAVENOUS; SUBCUTANEOUS at 03:07

## 2021-09-01 RX ADMIN — ACETAMINOPHEN 975 MG: 325 TABLET, FILM COATED ORAL at 03:07

## 2021-09-01 RX ADMIN — ALBUTEROL SULFATE 4 PUFF: 90 AEROSOL, METERED RESPIRATORY (INHALATION) at 08:25

## 2021-09-01 RX ADMIN — IPRATROPIUM BROMIDE 0.5 MG: 0.5 SOLUTION RESPIRATORY (INHALATION) at 13:19

## 2021-09-01 RX ADMIN — ENOXAPARIN SODIUM 40 MG: 40 INJECTION SUBCUTANEOUS at 15:06

## 2021-09-01 RX ADMIN — IPRATROPIUM BROMIDE 0.5 MG: 0.5 SOLUTION RESPIRATORY (INHALATION) at 08:08

## 2021-09-01 RX ADMIN — FUROSEMIDE 20 MG: 20 TABLET ORAL at 09:54

## 2021-09-01 RX ADMIN — GABAPENTIN 100 MG: 100 CAPSULE ORAL at 14:35

## 2021-09-01 RX ADMIN — ALBUTEROL SULFATE 4 PUFF: 90 AEROSOL, METERED RESPIRATORY (INHALATION) at 15:23

## 2021-09-01 RX ADMIN — ACETAMINOPHEN 975 MG: 325 TABLET, FILM COATED ORAL at 14:35

## 2021-09-01 RX ADMIN — GABAPENTIN 100 MG: 100 CAPSULE ORAL at 08:25

## 2021-09-01 RX ADMIN — LIDOCAINE 1 PATCH: 560 PATCH PERCUTANEOUS; TOPICAL; TRANSDERMAL at 08:25

## 2021-09-01 RX ADMIN — ACETAMINOPHEN 975 MG: 325 TABLET, FILM COATED ORAL at 08:25

## 2021-09-01 RX ADMIN — IPRATROPIUM BROMIDE 0.5 MG: 0.5 SOLUTION RESPIRATORY (INHALATION) at 04:03

## 2021-09-01 RX ADMIN — SENNOSIDES 10 ML: 8.8 LIQUID ORAL at 10:11

## 2021-09-01 ASSESSMENT — ACTIVITIES OF DAILY LIVING (ADL)
ADLS_ACUITY_SCORE: 13

## 2021-09-01 NOTE — PROGRESS NOTES
Vitals: Temp: 98.2  F (36.8  C) Temp src: Oral BP: 96/51 Pulse: 95   Resp: 18 SpO2: 98 % O2 Device: None (Room air)     Time: 2654-1438  Reason for admission: POD#2 s/p (R) middle lobectomy, lump node dissection, flexible bronchoscopy. Lung mass.   Activity:  Ax1 for ambulation to the toilet in the hallways. Uses incentive spirometer independently goal set to 1500.   Pain:  Pt reports pain to the exit site of the CT on the (R). Pain managed per MAR.  Neuro: A&O x4. Able to make needs known. Uses call light appropriately. Speech clear.   Cardiac: WDL ext for soft BP.  Respiratory:  Chest expansion symmetrical and non labored. Chest tube removed occlusive dressing CDI.  GI/:  Abd distendent, pt verbalized abd discomfort/fullness. Patient denies N/V. Voids spontaneously with adequate output.   Diet: regular diet, tolerating well.    Lines:  Removed (L) PIV.   Incisions/Drains/Skin:  Skin WDL ext for surgical incisions and lap sites.   Lab:  NA  Electrolyte Replacements:NA.  New changes this shift:   Patient discharged to relatives house. Dressing changed. Educated completed on the proper administration of Lovenox and how to change dressing if drainage from chest tube site noted. Patient verbalized understanding of teaching. Patient was transported to the discharge pharmacy and then front lobby by hospital staff using a wheelchair. Sister accompanied patient.

## 2021-09-01 NOTE — PROGRESS NOTES
"BP 98/56 (BP Location: Right arm)   Pulse 87   Temp 98.8  F (37.1  C) (Oral)   Resp 16   Ht 1.702 m (5' 7\")   Wt 67.5 kg (148 lb 12.8 oz)   SpO2 95%   BMI 23.31 kg/m       Neuro: AOx4, calm and cooperative     Cardiac: WNL    Respiratory: WNL, on 2L NC    GI/: abd soft, having BM's LBM 8/3121, +BS/Flatus, voiding without saving    Diet/Appetite: Regular    Skin: Old chest tube site CDI    LDA: PIV SL    Activity: SBA    Pain: Pain managed with schedule Tyleno and PRN IV dilaudid    Plan: posslbe discharge today, chest x-ray done, discontinue IV dilaudid continue POC    "

## 2021-09-01 NOTE — DISCHARGE SUMMARY
NAME: Yolanda Cheek   MRN: [id number]   : 1965     DATE OF ADMISSION: 2021     PRE/POSTOPERATIVE DIAGNOSES: Right middle lobe non-small cell lung cancer.    PROCEDURES PERFORMED:   1.  Right uniportal thoracoscopic middle lobe sleeve lobectomy with conversion to thoracotomy.    2.  Intercostal muscle mobilization and reinforcement of bronchial anastomosis.  3.  Mediastinal lymph node dissection.  4.  Flexible bronchoscopy.  5.  Intraoperative cryoablation of 7 intercostal nerves.    PATHOLOGY RESULTS: Final pathology pending at time of discharge.     CULTURE RESULTS: None     INTRAOPERATIVE COMPLICATIONS: None      POSTOPERATIVE MEDICAL ISSUES: Postop ileus, resolved prior to discharge    DRAINS/TUBES PRESENT AT DISCHARGE: None    DATE OF DISCHARGE:  21    HOSPITAL COURSE: Yolanda Cheek is a 56 year old female who on 2021 underwent the above-named procedures.  She tolerated the operation well and postoperatively was transferred to the general post-surgical unit.  The remainder of her course was essentially uncomplicated with the exception of the above noted ileus.  Prior to discharge, her pain was controlled well, she was able to perform ADLs and ambulate independently without difficulty, and had full return of bowel and bladder function.  On 21, she was discharged to home in stable condition.     DISCHARGE EXAM:    A&O, NAD  Resp non-labored  Distal extremities warm    Incisions healing well    DISCHARGE INSTRUCTIONS:  Discharge Procedure Orders   X-ray Chest 2 vws*   Standing Status: Future Standing Exp. Date: 21     Order Specific Question Answer Comments   Priority Routine    Select button to print No Print      Reason for your hospital stay   Order Comments: surgery     Order Specific Question Answer Comments   Select button to print No Print      Adult [facility] Follow-up and recommended labs and tests   Order Comments: 1.) Follow up with primary care physician, Ivette Stewart  H, in 1-2 weeks.  2.) Follow up with a thoracic surgery Clinical Nurse Specialist in Thoracic Surgery clinic in 1 month, prior to which a CXR should be performed.     Appointments on [facility] and/or [facility] (with [facility] or [facility] provider or service). Call [phone] if you haven't heard regarding these appointments within 7 days of discharge.     Order Specific Question Answer Comments   Select button to print No Print        DISCHARGE MEDICATIONS:   Discharge Medication List as of 9/1/2021  1:43 PM      START taking these medications    Details   enoxaparin ANTICOAGULANT (LOVENOX) 40 MG/0.4ML syringe Inject 0.4 mLs (40 mg) Subcutaneous every 24 hours for 2 days, Disp-0.8 mL, R-0, E-Prescribe      gabapentin (NEURONTIN) 100 MG capsule Take 1 capsule (100 mg) by mouth 3 times daily, Disp-180 capsule, R-0, E-Prescribe      Lidocaine (LIDOCARE) 4 % Patch Place 1 patch onto the skin every 24 hours To prevent lidocaine toxicity, patient should be patch free for 12 hrs daily.OTC      oxyCODONE (ROXICODONE) 5 MG tablet Take 1-2 tablets (5-10 mg) by mouth every 4 hours as needed for moderate to severe pain, Disp-40 tablet, R-0, E-Prescribe      SENNA-docusate sodium (SENNA S) 8.6-50 MG tablet Take 1 tablet by mouth At Bedtime, Disp-40 tablet, R-0, E-Prescribe         CONTINUE these medications which have CHANGED    Details   acetaminophen (TYLENOL) 325 MG tablet Take 3 tablets (975 mg) by mouth every 6 hours, OTC         CONTINUE these medications which have NOT CHANGED    Details   cholecalciferol 2000 UNITS tablet Take 2,000 Units by mouth 2 times daily, Disp-180 tablet, R-3, E-Prescribe      cyanocobalamin (VITAMIN B-12) 100 MCG tablet every morning , Historical      magnesium oxide (MAG-OX) 400 (241.3 Mg) MG tablet Take 400 mg by mouth every morning , Historical      ondansetron (ZOFRAN-ODT) 4 MG ODT tab Take 4 mg by mouth every 4 hours as needed for nausea , Historical      POTASSIUM PO Take by mouth every  morning , Historical      traZODone (DESYREL) 50 MG tablet Take 50 mg by mouth At Bedtime , Historical      vitamin C (ASCORBIC ACID) 250 MG tablet Take 250 mg by mouth every morning , Historical

## 2021-09-01 NOTE — PLAN OF CARE
Problem: Adult Inpatient Plan of Care  Goal: Plan of Care Review  Outcome: No Change  Goal: Patient-Specific Goal (Individualized)  Outcome: No Change     Problem: Adult Inpatient Plan of Care  Goal: Absence of Hospital-Acquired Illness or Injury  Outcome: Improving  Intervention: Identify and Manage Fall Risk  Recent Flowsheet Documentation  Taken 9/1/2021 0100 by Piotr Abdi RN  Safety Promotion/Fall Prevention: activity supervised  Taken 8/31/2021 2020 by Piotr Abdi RN  Safety Promotion/Fall Prevention: activity supervised  Intervention: Prevent Skin Injury  Recent Flowsheet Documentation  Taken 9/1/2021 0100 by Piotr Abdi RN  Body Position: position changed independently  Taken 8/31/2021 2020 by Piotr Abdi RN  Body Position: position changed independently  Goal: Optimal Comfort and Wellbeing  Outcome: Improving     Problem: Pain (Lung Surgery)  Goal: Acceptable Pain Control  Intervention: Prevent or Manage Pain  Recent Flowsheet Documentation  Taken 8/31/2021 2020 by Piotr Abdi RN  Pain Management Interventions: medication (see MAR)  Pain managed with IV dilaudid.

## 2021-09-02 NOTE — PLAN OF CARE
Occupational Therapy Discharge Summary    Reason for therapy discharge:    Discharged to home.    Progress towards therapy goal(s). See goals on Care Plan in Psychiatric electronic health record for goal details.  Pt. Not seen by d/cing therapist. Per chart pt. CGA/S with BADLs.    Therapy recommendation(s):    No further therapy is recommended.

## 2021-09-10 LAB
INTERPRETATION: NORMAL
MDL NUMBER: NORMAL
SIGNIFICANT RESULTS: NORMAL
SPECIMEN DESCRIPTION: NORMAL
TEST DETAILS, MDL: NORMAL

## 2021-09-10 PROCEDURE — G0452 MOLECULAR PATHOLOGY INTERPR: HCPCS | Mod: 26 | Performed by: PATHOLOGY

## 2021-09-11 ENCOUNTER — NURSE TRIAGE (OUTPATIENT)
Dept: NURSING | Facility: CLINIC | Age: 56
End: 2021-09-11

## 2021-09-11 DIAGNOSIS — R91.8 LUNG MASS: ICD-10-CM

## 2021-09-11 RX ORDER — OXYCODONE HYDROCHLORIDE 5 MG/1
5-10 TABLET ORAL EVERY 4 HOURS PRN
Qty: 40 TABLET | Refills: 0 | Status: SHIPPED | OUTPATIENT
Start: 2021-09-11

## 2021-09-12 NOTE — TELEPHONE ENCOUNTER
8/27 right middle lobectomy for  Adenocarcinoma.    Took last Oxycodone on 9/10 has been trying to take Tylenol every 6 hours for pain and it is not helping much at all  Continues to pain in her right side when the incision on and especially where the rib was FX during the surgery.  Rates pain as severe.     Would like pain medication called into the [facility] pharmacy at [place],.  Family member will  on Sunday morning when they open at 0900.      If pain becomes unbearable she can go into [facility] ED for help.    Radha paged  with help of page  at 9:38 pm.He will refill pain medication.  Called it into [facility] in [place].  Comes up as needing a prior authorization.  The patient can private pay for the medication when picked up or wait for the prior authorization to come through.  This was explainesd to Yolanda.  The patient can also take Motrin 600 mg every 6 hours tonight for pain control and  stagger with Tylenol per Dr. Rich..  Advised Nikki to log when she takes alfonzo pain medication and to eat something when she takes Motrin.  .     Patient verbalized the above directions.  Encouraged to call back if she as any questions or her condition changes.     Hyacinth May RN, [facility] Triage Nurse Advisor    Reason for Disposition    [1] MILD-MODERATE post-op pain (e.g., pain scale 1-7) AND [2] not controlled with pain medications    Additional Information    Negative: Sounds like a life-threatening emergency to the triager    Negative: [1] Widespread rash AND [2] bright red, sunburn-like    Negative: [1] SEVERE headache AND [2] after spinal (epidural) anesthesia    Negative: [1] Vomiting AND [2] persists > 4 hours    Negative: [1] Vomiting AND [2] abdomen looks much more swollen than usual    Negative: [1] Drinking very little AND [2] dehydration suspected (e.g., no urine > 12 hours, very dry mouth, very lightheaded)    Negative: Patient  sounds very sick or weak to the triager    Negative: Sounds like a serious complication to the triager    Negative: Fever > 100.4 F (38.0 C)    Negative: [1] SEVERE post-op pain (e.g., excruciating, pain scale 8-10) AND [2] not controlled with pain medications    Negative: [1] Caller has URGENT question AND [2] triager unable to answer question    Negative: [1] Headache AND [2] after spinal (epidural) anesthesia AND [3] not severe    Negative: Fever present > 3 days (72 hours)    Protocols used: POST-OP SYMPTOMS AND [id number]

## 2021-09-21 ENCOUNTER — TELEPHONE (OUTPATIENT)
Dept: SURGERY | Facility: CLINIC | Age: 56
End: 2021-09-21

## 2021-09-21 NOTE — TELEPHONE ENCOUNTER
Pt has apt w/PCP tomorrow and wants to make sure the CXR she is scheduled for is viewable by Nivia Urias who will see her in follow up on Thursday at 2 p.m.  Advised pt to check with PCP and imaging staff at [facility] to see if she needs to sign a release of information so images and report can be pushed to [facility] for review by providers. Pt's chart indicates she has Care Everywhere and [facility] is listed as one of the facilities, so likely images will be pushed.

## 2021-09-23 ENCOUNTER — VIRTUAL VISIT (OUTPATIENT)
Dept: SURGERY | Facility: CLINIC | Age: 56
End: 2021-09-23
Attending: THORACIC SURGERY (CARDIOTHORACIC VASCULAR SURGERY)
Payer: COMMERCIAL

## 2021-09-23 DIAGNOSIS — C34.2 LUNG CANCER, MIDDLE LOBE (H): Primary | ICD-10-CM

## 2021-09-23 DIAGNOSIS — R91.8 LUNG MASS: ICD-10-CM

## 2021-09-23 PROCEDURE — 99024 POSTOP FOLLOW-UP VISIT: CPT | Mod: 95 | Performed by: CLINICAL NURSE SPECIALIST

## 2021-09-23 PROCEDURE — 999N001193 HC VIDEO/TELEPHONE VISIT; NO CHARGE

## 2021-09-23 RX ORDER — IBUPROFEN 600 MG/1
600 TABLET, FILM COATED ORAL EVERY 6 HOURS PRN
Qty: 60 TABLET | Refills: 1 | Status: SHIPPED | OUTPATIENT
Start: 2021-09-23

## 2021-09-23 RX ORDER — IBUPROFEN 600 MG/1
600 TABLET, FILM COATED ORAL EVERY 6 HOURS PRN
Qty: 60 TABLET | Refills: 1 | Status: SHIPPED | OUTPATIENT
Start: 2021-09-23 | End: 2021-09-23

## 2021-09-23 NOTE — LETTER
9/23/2021         RE: Yolanda Cheek  [address]  [facility] [address]        Dear Colleague,    Thank you for referring your patient, Yolanda Cheek, to the [facility]. Please see a copy of my visit note below.    Yolanda is a 56 year old who is being evaluated via a billable telephone visit.      What phone number would you like to be contacted at? [phone]  How would you like to obtain your AVS? [employer]  Phone call duration: 30 minutes    THORACIC SURGERY FOLLOW UP VISIT    I spoke by telephone to Ms. Cheek in follow-up today. The clinical summary follows:     DATE OF ADMISSION: 8/27/2021      PRE/POSTOPERATIVE DIAGNOSES: Right middle lobe non-small cell lung cancer.     PROCEDURES PERFORMED:   1.  Right uniportal thoracoscopic middle lobe sleeve lobectomy with conversion to thoracotomy.    2.  Intercostal muscle mobilization and reinforcement of bronchial anastomosis.  3.  Mediastinal lymph node dissection.  4.  Flexible bronchoscopy.  5.  Intraoperative cryoablation of 7 intercostal nerves.     PATHOLOGY RESULTS:   Final Diagnosis   A.  Lymph node, level 7, excision:  -No malignancy identified in 2 lymph node fragments    B.  Lymph nodes, level 7, excision:  -Metastatic adenocarcinoma in 1 of 4 lymph nodes fragments  -Maximal tumor dimension: 0.2 cm  -Extranodal extension: Not identified     C.  Lymph nodes, 4R, excision:  -No malignancy identified in 4 lymph nodes     D.  Lymph nodes, 4R, excision:  -No malignancy identified in 4 lymph nodes     E.  Lymph nodes, 2R, excision:  -No malignancy identified in 3 lymph nodes     F.  Phrenic nerve margin #1, biopsy:  -No malignancy identified    G.  Hilar fat, biopsy:  -Benign fibroadipose tissue, negative for malignancy    H.  Lymph nodes, 10 R, excision:  -2 benign lymph node, negative for malignancy    I.  Lymph nodes, 11 R, excision:  -Metastatic adenocarcinoma in 1 of 7 lymph nodes  -Maximal tumor  "dimension: 0.1 cm  -Extranodal extension: Not identified    J.  Lung, right middle lobe, lobectomy:  -Invasive adenocarcinoma, micropapillary predominant  -Tumor size: 4.2 cm  -Angiolymphatic invasion: Not identified  -Visceropleural invasion: Identified  -Surgical resection margins free of tumor  -No malignancy identified in 4 parabronchial lymph nodes    K.  Final bronchial margin, excision:  -No malignancy identified     INTERVAL STUDIES:  CXR 9/22/21  FINDINGS/IMPRESSION:   The cardiac silhouette is unremarkable. Right-sided lobectomy changes are noted with scarring. Left lung is clear. No focal consolidation, effusion or pneumothorax.     SUBJECTIVE   \"I need to have something that takes this pain away, I cannot keep dealing with this pain\"    IMPRESSION (C34.2) Lung cancer, middle lobe (H)  (primary encounter diagnosis)    Plan: ibuprofen (ADVIL/MOTRIN) 600 MG tablet,         Increase gabapentin to 300 mg tid        (R91.8) Lung mass  Comment: Added automatically from request for surgery [id number]  Yolanda is a 57 yo female who is about 3 weeks post-op from her thoracotomy.   She reports \"excrutiating shooting pain and burning\" under her right breast.   This is intermittent but has not diminished in intensity.   She walks a block per day, feels a little winded for a few minutes upon returning home but this improves within minutes.   She denies fever, chills or cough.  She stopped narcotic analgesia 2 weeks ago due to constipation.   She is taking acetaminophen for pain, \"but it isn't really helping\".  She does apply ice off and on.  She has no nausea but has limited appetite.   She sleeps fairly good, and is getting help from her daughter in law daily.       We talked about neuropathic pain and I suggested we increase her gabapentin to 300 mg tid and also to add ibuprofen 600 mg q 6 hours.   I also told her that a heating pad may be more therapeutic at this stage to help inflammation.         We reviewed her CXR " and follow-up plan.   She has an appointment next Friday to meet with a medical oncologist in [place], [place].   I told her she would likely need some chemotherapy and that provider would then arrange needed CT surveillance around this treatment.  She was told that they have all the necessary records from our facility.              PLAN  I spent 30 min on the date of the encounter in chart review, patient visit, review of tests, documentation and/or discussion with other providers about the issues documented above. I reviewed the plan as follows:  Call with any questions or concerns.  Return PRN.  1. Necessary Tests & Appointments: Keep the consultation appointment with oncologist in [place]-   He will determine when next CT is needed, depending on chemotherapy cycles.  2. Pain Control Plan: Ibuprofen 600 mg q 6 hours, increase gabapentin to 300 mg tid  3. Anticoagulation Plan: n/a  4. Smoking Cessation: n/a    All questions were answered and the patient and present family were in agreement with the plan.  I appreciate the opportunity to participate in the care of your patient and will keep you updated.  Sincerely,  LACY Man, CNS      Again, thank you for allowing me to participate in the care of your patient.        Sincerely,        LACY Stephenson CNS

## 2021-09-23 NOTE — PROGRESS NOTES
Yolanda is a 56 year old who is being evaluated via a billable telephone visit.      What phone number would you like to be contacted at? [phone]  How would you like to obtain your AVS? [place]  Phone call duration: 30 minutes    THORACIC SURGERY FOLLOW UP VISIT    I spoke by telephone to Ms. Cheek in follow-up today. The clinical summary follows:     DATE OF ADMISSION: 8/27/2021      PRE/POSTOPERATIVE DIAGNOSES: Right middle lobe non-small cell lung cancer.     PROCEDURES PERFORMED:   1.  Right uniportal thoracoscopic middle lobe sleeve lobectomy with conversion to thoracotomy.    2.  Intercostal muscle mobilization and reinforcement of bronchial anastomosis.  3.  Mediastinal lymph node dissection.  4.  Flexible bronchoscopy.  5.  Intraoperative cryoablation of 7 intercostal nerves.     PATHOLOGY RESULTS:   Final Diagnosis   A.  Lymph node, level 7, excision:  -No malignancy identified in 2 lymph node fragments    B.  Lymph nodes, level 7, excision:  -Metastatic adenocarcinoma in 1 of 4 lymph nodes fragments  -Maximal tumor dimension: 0.2 cm  -Extranodal extension: Not identified     C.  Lymph nodes, 4R, excision:  -No malignancy identified in 4 lymph nodes     D.  Lymph nodes, 4R, excision:  -No malignancy identified in 4 lymph nodes     E.  Lymph nodes, 2R, excision:  -No malignancy identified in 3 lymph nodes     F.  Phrenic nerve margin #1, biopsy:  -No malignancy identified    G.  Hilar fat, biopsy:  -Benign fibroadipose tissue, negative for malignancy    H.  Lymph nodes, 10 R, excision:  -2 benign lymph node, negative for malignancy    I.  Lymph nodes, 11 R, excision:  -Metastatic adenocarcinoma in 1 of 7 lymph nodes  -Maximal tumor dimension: 0.1 cm  -Extranodal extension: Not identified    J.  Lung, right middle lobe, lobectomy:  -Invasive adenocarcinoma, micropapillary predominant  -Tumor size: 4.2 cm  -Angiolymphatic invasion: Not identified  -Visceropleural invasion: Identified  -Surgical resection  "margins free of tumor  -No malignancy identified in 4 parabronchial lymph nodes    K.  Final bronchial margin, excision:  -No malignancy identified     INTERVAL STUDIES:  CXR 9/22/21  FINDINGS/IMPRESSION:   The cardiac silhouette is unremarkable. Right-sided lobectomy changes are noted with scarring. Left lung is clear. No focal consolidation, effusion or pneumothorax.     SUBJECTIVE   \"I need to have something that takes this pain away, I cannot keep dealing with this pain\"    IMPRESSION (C34.2) Lung cancer, middle lobe (H)  (primary encounter diagnosis)    Plan: ibuprofen (ADVIL/MOTRIN) 600 MG tablet,         Increase gabapentin to 300 mg tid        (R91.8) Lung mass  Comment: Added automatically from request for surgery [id number]  Yolanda is a 55 yo female who is about 3 weeks post-op from her thoracotomy.   She reports \"excrutiating shooting pain and burning\" under her right breast.   This is intermittent but has not diminished in intensity.   She walks a block per day, feels a little winded for a few minutes upon returning home but this improves within minutes.   She denies fever, chills or cough.  She stopped narcotic analgesia 2 weeks ago due to constipation.   She is taking acetaminophen for pain, \"but it isn't really helping\".  She does apply ice off and on.  She has no nausea but has limited appetite.   She sleeps fairly good, and is getting help from her daughter in law daily.       We talked about neuropathic pain and I suggested we increase her gabapentin to 300 mg tid and also to add ibuprofen 600 mg q 6 hours.   I also told her that a heating pad may be more therapeutic at this stage to help inflammation.         We reviewed her CXR and follow-up plan.   She has an appointment next Friday to meet with a medical oncologist in [place], [place].   I told her she would likely need some chemotherapy and that provider would then arrange needed CT surveillance around this treatment.  She was told that they have all " the necessary records from our facility.              PLAN  I spent 30 min on the date of the encounter in chart review, patient visit, review of tests, documentation and/or discussion with other providers about the issues documented above. I reviewed the plan as follows:  Call with any questions or concerns.  Return PRN.  1. Necessary Tests & Appointments: Keep the consultation appointment with oncologist in [place]-   He will determine when next CT is needed, depending on chemotherapy cycles.  2. Pain Control Plan: Ibuprofen 600 mg q 6 hours, increase gabapentin to 300 mg tid  3. Anticoagulation Plan: n/a  4. Smoking Cessation: n/a    All questions were answered and the patient and present family were in agreement with the plan.  I appreciate the opportunity to participate in the care of your patient and will keep you updated.  Sincerely,  LACY Man, CNS

## 2021-09-27 ENCOUNTER — PATIENT OUTREACH (OUTPATIENT)
Dept: CARE COORDINATION | Facility: CLINIC | Age: 56
End: 2021-09-27

## 2021-09-27 NOTE — PROGRESS NOTES
Oncology Distress Screening Follow-up  Clinical Social Work  [facility]    Identified Concern and Score From Distress Screenin. How concerned are you about your ability to eat?   8Abnormal            2. How concerned are you about unintended weight loss or your current weight?   0           3. How concerned are you about feeling depressed or very sad?   8Abnormal            4. How concerned are you about feeling anxious or very scared?   6Abnormal            5. Do you struggle with the loss of meaning and lcara in your life?   A great dealAbnormal            6. How concerned are you about work and home life issues that may be affected by your cancer?   10Abnormal            7. How concerned are you about knowing what resources are available to help you?   10Abnormal            8. Do you currently have what you would describe as Judaism or spiritual struggles?              Quite a bitAbnormal            You can also ask to be contacted by one of our Oncology Supportive Care professionals.    9. If you want to be contacted by one of our professionals, I can send a message to them right now.   Oncology Social  Worker  I'm getting evicted             Date of Distress Screenin21    Intervention:   Yolanda is a 56-year-old woman with a diagnosis of non-small cell lung cancer s/p surgery 21. At time of follow-up appointment, Yolanda scored positive on distress screen, and indicated a desire for social work outreach. This clinician called Yolanda today with goal of introducing psychosocial services and support, and following up on elevated distress screen.     This clinician left Yolanda detailed voicemail with social work contact information and availability.     Education Provided:   Onc SW contact information    Follow-up Required:   This clinician will await return call from Yolanda and send [employer] information with additional resource information.    Please reach out to social work if in the case of  psychosocial concern or need.      LION Sims, LICSW  Phone: [phone]  [facility]: M, [place]  *every other Tue, 8am-4:30pm  [facility]: W, F, *every other Tue, 8am-4:30pm

## 2021-10-09 ENCOUNTER — HEALTH MAINTENANCE LETTER (OUTPATIENT)
Age: 56
End: 2021-10-09

## 2022-09-11 ENCOUNTER — HEALTH MAINTENANCE LETTER (OUTPATIENT)
Age: 57
End: 2022-09-11

## 2023-01-01 ENCOUNTER — HEALTH MAINTENANCE LETTER (OUTPATIENT)
Age: 58
End: 2023-01-01

## (undated) DEVICE — SU VICRYL 3-0 SH 27" J316H

## (undated) DEVICE — Device

## (undated) DEVICE — SU VICRYL 0 UR-6 27" J603H

## (undated) DEVICE — SU ETHIBOND 5 LRDA 30" B499T

## (undated) DEVICE — LINEN TOWEL PACK X5 5464

## (undated) DEVICE — COVER CAMERA IN-LIGHT DISP LT-C02

## (undated) DEVICE — ENDO CLIP CARTIRDGE K2 MED/LG 10 1112

## (undated) DEVICE — GLOVE PROTEXIS POWDER FREE 8.0 ORTHOPEDIC 2D73ET80

## (undated) DEVICE — LINEN TOWEL PACK X30 5481

## (undated) DEVICE — LINEN GOWN XLG 5407

## (undated) DEVICE — PREP CHLORAPREP 26ML TINTED ORANGE  260815

## (undated) DEVICE — SU VICRYL 4-0 RB-1 27" J304

## (undated) DEVICE — SU SILK 0 TIE 6X30" A306H

## (undated) DEVICE — SUCTION TIP YANKAUER STR K87

## (undated) DEVICE — SUCTION MANIFOLD NEPTUNE 2 SYS 4 PORT 0702-020-000

## (undated) DEVICE — SU PROLENE 3-0 SHDA 36" 8522H

## (undated) DEVICE — ENDO VALVE BX EVIS MAJ-210

## (undated) DEVICE — SURGICEL HEMOSTAT 4X8" 1952

## (undated) DEVICE — SU VICRYL 0 CTX 36" J370H

## (undated) DEVICE — DRSG PRIMAPORE 03 1/8X6" 66000318

## (undated) DEVICE — SU VICRYL 2-0 SH 27" UND J417H

## (undated) DEVICE — LINEN TOWEL PACK X6 WHITE 5487

## (undated) DEVICE — DRSG MEPILEX LITE 4X4" 284190

## (undated) DEVICE — ESU ELEC BLADE 6" COATED/INSULATED E1455-6

## (undated) DEVICE — CATH PROBE CRYOABLATION CRYOICE FLEX 10CM CRYO3

## (undated) DEVICE — DRAPE IOBAN INCISE 23X17" 6650EZ

## (undated) DEVICE — SPONGE LAP 18X18" X8435

## (undated) DEVICE — ADH SKIN CLOSURE PREMIERPRO EXOFIN 1.0ML 3470

## (undated) DEVICE — LUBRICANT INST KIT ENDO-LUBE 220-90

## (undated) DEVICE — SYR BULB IRRIG 50ML LATEX FREE 0035280

## (undated) DEVICE — ESU ELEC BLADE 2.75" COATED/INSULATED E1455

## (undated) DEVICE — SU SILK 2-0 SH 30" K833H

## (undated) DEVICE — ESU PENCIL W/COATED BLADE E2450H

## (undated) DEVICE — DRAIN CHEST TUBE 24FR STR 8024

## (undated) DEVICE — DRAPE SHEET REV FOLD 3/4 9349

## (undated) DEVICE — LIGHT HANDLE X1 31140133

## (undated) DEVICE — SU SILK 0 SH 30" K834H

## (undated) DEVICE — SPONGE TONSIL W/STRING MED 23275-680

## (undated) DEVICE — VESSEL LOOPS YELLOW MAXI 31145694

## (undated) DEVICE — SU VICRYL 4-0 PS-2 18" UND J496H

## (undated) DEVICE — BLADE KNIFE SURG 15 371115

## (undated) DEVICE — ENDO VALVE SUCTION BRONCH EVIS MAJ-209

## (undated) DEVICE — SUCTION DRY CHEST DRAIN OASIS 3600-100

## (undated) DEVICE — SPONGE RAY-TEC 4X8" 7318

## (undated) DEVICE — DRSG PRIMAPORE 02X3" 7133

## (undated) DEVICE — TIES BANDING T50R

## (undated) DEVICE — DRSG DRAIN 4X4" 7086

## (undated) DEVICE — DRSG TELFA 3X8" 1238

## (undated) DEVICE — SYR 30ML LL W/O NDL 302832

## (undated) DEVICE — TUBING SUCTION 10'X3/16" N510

## (undated) DEVICE — ESU GROUND PAD ADULT W/CORD E7507

## (undated) RX ORDER — FENTANYL CITRATE 50 UG/ML
INJECTION, SOLUTION INTRAMUSCULAR; INTRAVENOUS
Status: DISPENSED
Start: 2021-08-27

## (undated) RX ORDER — FENTANYL CITRATE-0.9 % NACL/PF 10 MCG/ML
PLASTIC BAG, INJECTION (ML) INTRAVENOUS
Status: DISPENSED
Start: 2021-08-27

## (undated) RX ORDER — HYDROMORPHONE HYDROCHLORIDE 1 MG/ML
INJECTION, SOLUTION INTRAMUSCULAR; INTRAVENOUS; SUBCUTANEOUS
Status: DISPENSED
Start: 2021-08-27

## (undated) RX ORDER — GABAPENTIN 300 MG/1
CAPSULE ORAL
Status: DISPENSED
Start: 2021-08-27

## (undated) RX ORDER — CHLORHEXIDINE GLUCONATE ORAL RINSE 1.2 MG/ML
SOLUTION DENTAL
Status: DISPENSED
Start: 2021-08-27

## (undated) RX ORDER — LIDOCAINE HYDROCHLORIDE 20 MG/ML
INJECTION, SOLUTION EPIDURAL; INFILTRATION; INTRACAUDAL; PERINEURAL
Status: DISPENSED
Start: 2021-08-27

## (undated) RX ORDER — GLYCOPYRROLATE 0.2 MG/ML
INJECTION, SOLUTION INTRAMUSCULAR; INTRAVENOUS
Status: DISPENSED
Start: 2021-08-27

## (undated) RX ORDER — ALBUTEROL SULFATE 0.83 MG/ML
SOLUTION RESPIRATORY (INHALATION)
Status: DISPENSED
Start: 2021-08-25

## (undated) RX ORDER — DEXAMETHASONE SODIUM PHOSPHATE 10 MG/ML
INJECTION, SOLUTION INTRAMUSCULAR; INTRAVENOUS
Status: DISPENSED
Start: 2021-08-27

## (undated) RX ORDER — GABAPENTIN 100 MG/1
CAPSULE ORAL
Status: DISPENSED
Start: 2021-08-27

## (undated) RX ORDER — CELECOXIB 200 MG/1
CAPSULE ORAL
Status: DISPENSED
Start: 2021-08-27

## (undated) RX ORDER — BUPIVACAINE HYDROCHLORIDE AND EPINEPHRINE 5; 5 MG/ML; UG/ML
INJECTION, SOLUTION PERINEURAL
Status: DISPENSED
Start: 2021-08-27

## (undated) RX ORDER — METOPROLOL TARTRATE 1 MG/ML
INJECTION, SOLUTION INTRAVENOUS
Status: DISPENSED
Start: 2021-08-27

## (undated) RX ORDER — ACETAMINOPHEN 325 MG/1
TABLET ORAL
Status: DISPENSED
Start: 2021-08-27

## (undated) RX ORDER — CEFAZOLIN SODIUM 1 G/3ML
INJECTION, POWDER, FOR SOLUTION INTRAMUSCULAR; INTRAVENOUS
Status: DISPENSED
Start: 2021-08-27

## (undated) RX ORDER — PROPOFOL 10 MG/ML
INJECTION, EMULSION INTRAVENOUS
Status: DISPENSED
Start: 2021-08-27

## (undated) RX ORDER — SODIUM CHLORIDE, SODIUM LACTATE, POTASSIUM CHLORIDE, CALCIUM CHLORIDE 600; 310; 30; 20 MG/100ML; MG/100ML; MG/100ML; MG/100ML
INJECTION, SOLUTION INTRAVENOUS
Status: DISPENSED
Start: 2021-08-27

## (undated) RX ORDER — ONDANSETRON 2 MG/ML
INJECTION INTRAMUSCULAR; INTRAVENOUS
Status: DISPENSED
Start: 2021-08-27

## (undated) RX ORDER — BUPIVACAINE HYDROCHLORIDE 2.5 MG/ML
INJECTION, SOLUTION EPIDURAL; INFILTRATION; INTRACAUDAL
Status: DISPENSED
Start: 2021-08-27

## (undated) RX ORDER — DEXMEDETOMIDINE HYDROCHLORIDE 100 UG/ML
INJECTION, SOLUTION INTRAVENOUS
Status: DISPENSED
Start: 2021-08-27